# Patient Record
Sex: FEMALE | Race: WHITE | URBAN - METROPOLITAN AREA
[De-identification: names, ages, dates, MRNs, and addresses within clinical notes are randomized per-mention and may not be internally consistent; named-entity substitution may affect disease eponyms.]

---

## 2015-07-14 LAB — TSH SERPL-ACNC: 3.15 UIU/ML (ref 0.33–4.7)

## 2016-02-24 LAB
HPV ABSTRACT: ABNORMAL
PAP-ABSTRACT: ABNORMAL

## 2016-11-28 LAB
CHOLEST SERPL-MCNC: 127 MG/DL
CREAT SERPL-MCNC: 1.1 MG/DL (ref 0.6–1.3)
GFR SERPL CREATININE-BSD FRML MDRD: 54 ML/MIN/1.73M2
GLUCOSE SERPL-MCNC: 95 MG/DL (ref 70–140)
HDLC SERPL-MCNC: 41 MG/DL (ref 40–59)
LDLC SERPL CALC-MCNC: 65 MG/DL
POTASSIUM SERPL-SCNC: 3.6 MMOL/L (ref 3.7–5.5)
TRIGL SERPL-MCNC: 106 MG/DL
TSH SERPL-ACNC: 2.26 UIU/ML (ref 0.33–4.7)

## 2017-01-12 ENCOUNTER — TRANSFERRED RECORDS (OUTPATIENT)
Dept: HEALTH INFORMATION MANAGEMENT | Facility: CLINIC | Age: 47
End: 2017-01-12

## 2017-01-12 LAB — PHQ9 SCORE: 1

## 2017-03-23 ENCOUNTER — TRANSFERRED RECORDS (OUTPATIENT)
Dept: HEALTH INFORMATION MANAGEMENT | Facility: CLINIC | Age: 47
End: 2017-03-23

## 2017-03-23 LAB — PAP-ABSTRACT: NORMAL

## 2017-04-25 ENCOUNTER — OFFICE VISIT (OUTPATIENT)
Dept: FAMILY MEDICINE | Facility: CLINIC | Age: 47
End: 2017-04-25
Payer: COMMERCIAL

## 2017-04-25 VITALS
WEIGHT: 133.6 LBS | SYSTOLIC BLOOD PRESSURE: 96 MMHG | TEMPERATURE: 99.3 F | HEART RATE: 75 BPM | BODY MASS INDEX: 22.81 KG/M2 | OXYGEN SATURATION: 99 % | DIASTOLIC BLOOD PRESSURE: 60 MMHG | HEIGHT: 64 IN

## 2017-04-25 DIAGNOSIS — F32.5 MAJOR DEPRESSIVE DISORDER WITH SINGLE EPISODE, IN REMISSION (H): ICD-10-CM

## 2017-04-25 DIAGNOSIS — R87.619 ATYPICAL GLANDULAR CELLS ON CERVICAL PAP SMEAR: Primary | ICD-10-CM

## 2017-04-25 DIAGNOSIS — Z12.31 ENCOUNTER FOR SCREENING MAMMOGRAM FOR BREAST CANCER: ICD-10-CM

## 2017-04-25 DIAGNOSIS — L70.0 ACNE VULGARIS: ICD-10-CM

## 2017-04-25 DIAGNOSIS — E03.9 ACQUIRED HYPOTHYROIDISM: ICD-10-CM

## 2017-04-25 PROCEDURE — 99204 OFFICE O/P NEW MOD 45 MIN: CPT | Performed by: FAMILY MEDICINE

## 2017-04-25 RX ORDER — BUPROPION HYDROCHLORIDE 100 MG/1
100 TABLET, EXTENDED RELEASE ORAL DAILY
Refills: 3 | COMMUNITY
Start: 2017-04-15 | End: 2017-06-23

## 2017-04-25 RX ORDER — BIOTIN 1 MG
1000 TABLET ORAL DAILY
COMMUNITY
End: 2017-11-28

## 2017-04-25 RX ORDER — NORETHINDRONE ACETATE AND ETHINYL ESTRADIOL 1MG-20(21)
1 KIT ORAL DAILY
COMMUNITY
End: 2017-06-23

## 2017-04-25 RX ORDER — ESCITALOPRAM OXALATE 10 MG/1
10 TABLET ORAL DAILY
Refills: 3 | COMMUNITY
Start: 2017-04-19 | End: 2017-07-13

## 2017-04-25 RX ORDER — CLINDAMYCIN PHOSPHATE 10 UG/ML
LOTION TOPICAL 2 TIMES DAILY
COMMUNITY
End: 2017-08-25

## 2017-04-25 ASSESSMENT — ANXIETY QUESTIONNAIRES
3. WORRYING TOO MUCH ABOUT DIFFERENT THINGS: NOT AT ALL
GAD7 TOTAL SCORE: 0
6. BECOMING EASILY ANNOYED OR IRRITABLE: NOT AT ALL
2. NOT BEING ABLE TO STOP OR CONTROL WORRYING: NOT AT ALL
1. FEELING NERVOUS, ANXIOUS, OR ON EDGE: NOT AT ALL
7. FEELING AFRAID AS IF SOMETHING AWFUL MIGHT HAPPEN: NOT AT ALL
5. BEING SO RESTLESS THAT IT IS HARD TO SIT STILL: NOT AT ALL
IF YOU CHECKED OFF ANY PROBLEMS ON THIS QUESTIONNAIRE, HOW DIFFICULT HAVE THESE PROBLEMS MADE IT FOR YOU TO DO YOUR WORK, TAKE CARE OF THINGS AT HOME, OR GET ALONG WITH OTHER PEOPLE: NOT DIFFICULT AT ALL

## 2017-04-25 ASSESSMENT — PATIENT HEALTH QUESTIONNAIRE - PHQ9: 5. POOR APPETITE OR OVEREATING: NOT AT ALL

## 2017-04-25 NOTE — Clinical Note
Please abstract the following data from this visit with this patient into the appropriate field in Epic:  Colonoscopy done on this date: 1/11/2013 (approximately), by this group: [facility], results were family hx colon cancer - repeat 5 years   Pap smear done on this date: 3/23/17 (approximately), by this group: [facility], results were Atypical glandular cells Endocervical type  Sarah Severson, CMA

## 2017-04-25 NOTE — MR AVS SNAPSHOT
After Visit Summary   4/25/2017    Mary Pearson    MRN: [id number]           Patient Information     Date Of Birth          1970        Visit Information        Provider Department      4/25/2017 6:00 PM Patricio Miller MD [facility] [place] [facility]        Today's Diagnoses     Atypical glandular cells on cervical Pap smear    -  1    Encounter for screening mammogram for breast cancer        Acne vulgaris        Acquired hypothyroidism        Major depressive disorder with single episode, in remission (H)           Follow-ups after your visit        Additional Services     DERMATOLOGY REFERRAL       Your provider has referred you to: [facility] [phone]   [email or web address]    Please be aware that coverage of these services is subject to the terms and limitations of your health insurance plan.  Call member services at your health plan with any benefit or coverage questions.      Please bring the following with you to your appointment:    (1) Any X-Rays, CTs or MRIs which have been performed.  Contact the facility where they were done to arrange for  prior to your scheduled appointment.    (2) List of current medications  (3) This referral request   (4) Any documents/labs given to you for this referral                  Your next 10 appointments already scheduled     May 02, 2017  4:20 PM CDT   Office Visit with Patricio Miller MD   [facility] [place] ([facility])    [address] [place] [id number] [phone]           Bring a current list of meds and any records pertaining to this visit.  For Physicals, please bring immunization records and any forms needing to be filled out.  Please arrive 10 minutes early to complete paperwork.              Future tests that were ordered for you today     Open Future Orders        Priority Expected Expires Ordered    *MA Screening Digital Bilateral  "Routine  2018    US Pelvic Complete w Transvaginal Routine 2017            Who to contact     If you have questions or need follow up information about today's clinic visit or your schedule please contact [facility] [place] directly at [phone].  Normal or non-critical lab and imaging results will be communicated to you by MyChart, letter or phone within 4 business days after the clinic has received the results. If you do not hear from us within 7 days, please contact the clinic through [employer] or phone. If you have a critical or abnormal lab result, we will notify you by phone as soon as possible.  Submit refill requests through [employer] or call your pharmacy and they will forward the refill request to us. Please allow 3 business days for your refill to be completed.          Additional Information About Your Visit        [employer] Information     [employer] lets you send messages to your doctor, view your test results, renew your prescriptions, schedule appointments and more. To sign up, go to [email or web address] . Click on \"Log in\" on the left side of the screen, which will take you to the Welcome page. Then click on \"Sign up Now\" on the right side of the page.     You will be asked to enter the access code listed below, as well as some personal information. Please follow the directions to create your username and password.     Your access code is: [email or web address]  Expires: 2017  8:13 AM     Your access code will  in 90 days. If you need help or a new code, please call your [facility] clinic or [phone].        Care EveryWhere ID     This is your Care EveryWhere ID. This could be used by other organizations to access your [facility] medical records  [id number]        Your Vitals Were     Pulse Temperature Height Pulse Oximetry Breastfeeding? BMI (Body Mass Index)    75 99.3  F (37.4  C) (Tympanic) 5' 3.5\" (1.613 m) 99% No 23.29 kg/m2       Blood " Pressure from Last 3 Encounters:   04/25/17 96/60    Weight from Last 3 Encounters:   04/25/17 133 lb 9.6 oz (60.6 kg)              We Performed the Following     DERMATOLOGY REFERRAL        Primary Care Provider    None Specified       No primary provider on file.        Thank you!     Thank you for choosing [facility] [place]  for your care. Our goal is always to provide you with excellent care. Hearing back from our patients is one way we can continue to improve our services. Please take a few minutes to complete the written survey that you may receive in the mail after your visit with us. Thank you!             Your Updated Medication List - Protect others around you: Learn how to safely use, store and throw away your medicines at www.disposemymeds.org.          This list is accurate as of: 4/25/17 11:59 PM.  Always use your most recent med list.                   Brand Name Dispense Instructions for use    biotin 1000 MCG Tabs tablet      Take 1,000 mcg by mouth daily Takes 2,500mcg       buPROPion 100 MG 12 hr tablet    WELLBUTRIN SR     Take 100 mg by mouth daily       clindamycin 1 % lotion    CLEOCIN T     Apply topically 2 times daily       escitalopram 10 MG tablet    LEXAPRO     Take 10 mg by mouth daily       LEVOTHYROXINE SODIUM PO      Take 88 mcg by mouth daily       METROCREAM 0.75 % cream   Generic drug:  metroNIDAZOLE      Apply topically 2 times daily       norethindrone-ethinyl estradiol 1-20 MG-MCG per tablet    JUNEL FE 1/20     Take 1 tablet by mouth daily       PROBIOTIC ADVANCED PO

## 2017-04-25 NOTE — NURSING NOTE
"Chief Complaint   Patient presents with     Establish Care       Initial BP 96/60 (BP Location: Right arm, Cuff Size: Adult Regular)  Pulse 75  Temp 99.3  F (37.4  C) (Tympanic)  Ht 5' 3.5\" (1.613 m)  Wt 133 lb 9.6 oz (60.6 kg)  SpO2 99%  Breastfeeding? No  BMI 23.29 kg/m2 Estimated body mass index is 23.29 kg/(m^2) as calculated from the following:    Height as of this encounter: 5' 3.5\" (1.613 m).    Weight as of this encounter: 133 lb 9.6 oz (60.6 kg).  Medication Reconciliation: complete  "

## 2017-04-25 NOTE — PROGRESS NOTES
SUBJECTIVE:                                                    Mary Pearson is a 47 year old female who presents to clinic today for the following health issues:      New Patient/Transfer of Care    Patient recently moved from [place]. Reports that she is here to establish her care. Previously was going to [facility] in [place]. Care everywhere was accessed to review her records.  She does not need any refills on her medication.  Last month she had a Pap smear. She was told that Pap smear is normal. An Pap smear results were pulled through care everywhere it showed atypical glandular cells. This was reviewed with the patient and differential diagnoses and further management discussed. Patient will need to get a pelvic ultrasound in the next few days and a follow-up within the week for colposcopy and endometrial biopsy. She verbalizes understanding and agreement to the plan    Patient is due for a mammogram.    Colonoscopy is up to date. Family history of colon cancer. Patient has history of colon polyps.    Depression is well managed. Currently using Lexapro 10 mg and Wellbutrin.  PHQ-9 SCORE 4/25/2017   Total Score 0     She uses acne medication. Acne is not well controlled. Requested dermatology referral.    Also uses Synthroid. Last TSH was checked in November 2016 it was within normal range. It was reviewed in care everywhere.        Problem list and histories reviewed & adjusted, as indicated.  Additional history: as documented    Patient Active Problem List   Diagnosis     Atypical glandular cells on cervical Pap smear     Acne vulgaris     Acquired hypothyroidism     Major depressive disorder with single episode, in remission (H)     DUB (dysfunctional uterine bleeding) - on OCP     Past Surgical History:   Procedure Laterality Date     AS REDUCTION OF LARGE BREAST         Social History   Substance Use Topics     Smoking status: Never Smoker     Smokeless tobacco: Never Used     Alcohol use Yes  "     Comment: once a month     Family History   Problem Relation Age of Onset     Colon Cancer Mother 49     DIABETES Father          Current Outpatient Prescriptions   Medication Sig Dispense Refill     buPROPion (WELLBUTRIN SR) 100 MG 12 hr tablet Take 100 mg by mouth daily  3     escitalopram (LEXAPRO) 10 MG tablet Take 10 mg by mouth daily  3     LEVOTHYROXINE SODIUM PO Take 88 mcg by mouth daily       norethindrone-ethinyl estradiol (JUNEL FE 1/20) 1-20 MG-MCG per tablet Take 1 tablet by mouth daily       clindamycin (CLEOCIN T) 1 % lotion Apply topically 2 times daily       metroNIDAZOLE (METROCREAM) 0.75 % cream Apply topically 2 times daily       biotin 1000 MCG TABS tablet Take 1,000 mcg by mouth daily Takes 2,500mcg       Probiotic Product (PROBIOTIC ADVANCED PO)        Allergies   Allergen Reactions     Nickel Rash       Reviewed and updated as needed this visit by clinical staff       Reviewed and updated as needed this visit by Provider         ROS:  C: NEGATIVE for fever, chills, change in weight  I: NEGATIVE for worrisome rashes, moles or lesions  E: NEGATIVE for vision changes or irritation  E/M: NEGATIVE for ear, mouth and throat problems  R: NEGATIVE for significant cough or SOB  B: NEGATIVE for masses, tenderness or discharge  CV: NEGATIVE for chest pain, palpitations or peripheral edema  GI: NEGATIVE for nausea, abdominal pain, heartburn, or change in bowel habits  : NEGATIVE for frequency, dysuria, or hematuria  M: NEGATIVE for significant arthralgias or myalgia  N: NEGATIVE for weakness, dizziness or paresthesias  E: NEGATIVE for temperature intolerance, skin/hair changes  H: NEGATIVE for bleeding problems  P: NEGATIVE for changes in mood or affect    OBJECTIVE:                                                    BP 96/60 (BP Location: Right arm, Cuff Size: Adult Regular)  Pulse 75  Temp 99.3  F (37.4  C) (Tympanic)  Ht 5' 3.5\" (1.613 m)  Wt 133 lb 9.6 oz (60.6 kg)  SpO2 99%  " Breastfeeding? No  BMI 23.29 kg/m2  Body mass index is 23.29 kg/(m^2).  GENERAL: healthy, alert and no distress  EYES: Eyes grossly normal to inspection, extraocular movements - intact, and PERRL  HENT: ear canals- normal; TMs- normal; Nose- normal; Mouth- no ulcers, no lesions  NECK: no tenderness, no adenopathy, no asymmetry, no masses, no stiffness; thyroid- normal to palpation  RESP: lungs clear to auscultation - no rales, no rhonchi, no wheezes  CV: regular rates and rhythm, normal S1 S2, no S3 or S4 and no murmur, no click or rub -  ABDOMEN: soft, no tenderness, no  hepatosplenomegaly, no masses, normal bowel sounds  MS: extremities- no gross deformities noted, no edema  SKIN: no suspicious lesions, no rashes  NEURO: strength and tone- normal, sensory exam- grossly normal, mentation- intact, speech- normal, reflexes- symmetric  BACK: no CVA tenderness, no paralumbar tenderness  - female: cervix- normal, adnexae- normal; uterus- normal, no masses, no discharge  LYMPHATICS: ant. cervical- normal, post. cervical- normal, axillary- normal, supraclavicular- normal, inguinal- normal         ASSESSMENT/PLAN:                                                      1. Atypical glandular cells on cervical Pap smear  Plan as above. Patient will see me back for colposcopy and endometrial biopsy after pelvic ultrasound is completed  - US Pelvic Complete w Transvaginal; Future    2. Encounter for screening mammogram for breast cancer  Screening mammogram ordered  - *MA Screening Digital Bilateral; Future    3. Acne vulgaris  Dermatology referral provided  - DERMATOLOGY REFERRAL    4. Acquired hypothyroidism  TSH is within normal range. No refills requested at this time    5. Major depressive disorder with single episode, in remission (H)  Well-controlled. She will contact me when she is out of refills.      Follow up with Provider - within 1 week.     Patricio Miller MD  [facility]

## 2017-04-26 PROBLEM — R87.619 ATYPICAL GLANDULAR CELLS ON CERVICAL PAP SMEAR: Status: ACTIVE | Noted: 2017-04-26

## 2017-04-26 PROBLEM — E03.9 ACQUIRED HYPOTHYROIDISM: Status: ACTIVE | Noted: 2017-04-26

## 2017-04-26 PROBLEM — L70.0 ACNE VULGARIS: Status: ACTIVE | Noted: 2017-04-26

## 2017-04-26 PROBLEM — N93.8 DUB (DYSFUNCTIONAL UTERINE BLEEDING): Status: ACTIVE | Noted: 2017-04-26

## 2017-04-26 PROBLEM — F32.5 MAJOR DEPRESSIVE DISORDER WITH SINGLE EPISODE, IN REMISSION (H): Status: ACTIVE | Noted: 2017-04-26

## 2017-04-26 ASSESSMENT — ANXIETY QUESTIONNAIRES: GAD7 TOTAL SCORE: 0

## 2017-04-26 ASSESSMENT — PATIENT HEALTH QUESTIONNAIRE - PHQ9: SUM OF ALL RESPONSES TO PHQ QUESTIONS 1-9: 0

## 2017-04-28 ENCOUNTER — TELEPHONE (OUTPATIENT)
Dept: FAMILY MEDICINE | Facility: CLINIC | Age: 47
End: 2017-04-28

## 2017-04-28 NOTE — TELEPHONE ENCOUNTER
Patient calling to cancel orders for colposcopy and ultra sound. Patient will be seeing her OBGYN for concerns. She wanted to update Dr. Miller.  Ok to leave any messages: yes  [phone] (home)     Thank you  An Le

## 2017-05-03 ENCOUNTER — OFFICE VISIT (OUTPATIENT)
Dept: FAMILY MEDICINE | Facility: CLINIC | Age: 47
End: 2017-05-03
Payer: COMMERCIAL

## 2017-05-03 DIAGNOSIS — L71.9 ROSACEA: Primary | ICD-10-CM

## 2017-05-03 DIAGNOSIS — L70.0 ACNE VULGARIS: ICD-10-CM

## 2017-05-03 PROCEDURE — 99214 OFFICE O/P EST MOD 30 MIN: CPT | Performed by: FAMILY MEDICINE

## 2017-05-03 RX ORDER — DOXYCYCLINE HYCLATE 50 MG/1
50 TABLET, FILM COATED ORAL DAILY
Qty: 30 TABLET | Refills: 6 | Status: SHIPPED | OUTPATIENT
Start: 2017-05-03 | End: 2017-07-13

## 2017-05-03 RX ORDER — METRONIDAZOLE 10 MG/G
GEL TOPICAL DAILY
Qty: 60 G | Refills: 11 | Status: SHIPPED | OUTPATIENT
Start: 2017-05-03 | End: 2017-07-13

## 2017-05-03 RX ORDER — CLINDAMYCIN PHOSPHATE 10 MG/G
GEL TOPICAL
Qty: 30 G | Refills: 11 | Status: SHIPPED | OUTPATIENT
Start: 2017-05-03 | End: 2017-07-13

## 2017-05-03 NOTE — PROGRESS NOTES
[facility] - PRIMARY CARE SKIN    CC : Acne   SUBJECTIVE:                                                    Mary Pearson is a 47 year old female who presents to clinic today because of problems with acne and rosacea. Acne control has begun to improve and may have been provoked by increased stress from moving to [place]. She has also been recently diagnosed with rosacea on the forehead.    Current treatment : Clindamycin 1% lotion on the chin, metronidazole 0.75% cream, Junel BCP ( for acne control )  Response to treatment : Metronidazole cream has kept symptoms at bay until the next day when applied twice daily, but she has not applied it every night.  Side effects noted : She reports excessive skin flaking after use of benzoyl peroxide.    Other treatments : Doxycycline in the past, oral BCP for acne and regulation of menstrual cycle.  Products used : Merle Odell facial moisturizer. Dove soap to wash face.  Skin type : dry skin on the cheeks, oily on the forehead    Refer to electronic medical record (EMR) for past medical history and medications.    INTEGUMENTARY/SKIN: POSITIVE for acne  ROS : 14 point review of systems was negative except the symptoms listed above in the HPI.    This document serves as a record of the services and decisions personally performed and made by Cortney Virgen MD. It was created on her behalf by Silverio Sutherland, a trained medical scribe.  The creation of this document is based on the scribe's personal observations and the provider's statements to the medical scribe.  Silverio Sutherland, May 3, 2017 3:22 PM      OBJECTIVE:                                                    GENERAL: healthy, alert and no distress  SKIN: Alejandro Skin Type - I.  Face were examined. The dermatoscope was used to help evaluate pigmented lesions.  Skin Pertinent Findings:  Face : Inflammatory papules in various stages of resolution on the chin   Neck: clear  Forehead : Scattered erythematous papules in monophasic  appearance.                          Eyes : C&S are clear  Diagnostic Test Results:  none     MDM : . Discussed pathophysiology of acne, treatment options, and expectations for treatment response. Discussed treatment options for the rosacea.      ASSESSMENT:                                                      Encounter Diagnoses   Name Primary?     Rosacea Yes     Acne vulgaris          PLAN:                                                    Patient Instructions   FUTURE APPOINTMENTS  Follow up in 6 month(s).    TOPICAL MEDICATION INSTRUCTIONS  Metronidazole (Metrogel) 1% gel.    Apply a layer to the affected area(s) on the forehead two times per day, everyday regardless if symptoms are present or not.    Keep in mind to also regularly use moisturizer, as this preventative measure can help maintain your skin's natural moisture barrier.    Apply moisturizer after application of medication.    ORAL ANTIBIOTIC  Take by mouth doxycycline 50 mg one time(s) a day.    TETRACYCLINE ANTIBIOTIC INFORMATION  Minocycline and doxycycline are tetracycline antibiotics and can increase your sun sensitivity, so make sure to be vigilant in regularly applying sunscreen. Also, avoid taking these with dairy products, as calcium can bind the antibiotic, making it unavailable to your body.    Avoid excessive alcohol, caffeine, chocolate, spicy food consumption or sun exposure.    TOPICAL ANTIBIOTIC  Apply clindamycin 1% gel to bumps as spot treatmnent two time(s) a day.    Consider using Image facial moisturizer cream, CeraVe AM facial moisturizer, Cetaphil facial moisturizer.        PROCEDURES:                                                    None.    TT : 25 minutes.  CT : 15 minutes.      The information in this document, created by the medical scribe for me, accurately reflects the services I personally performed and the decisions made by me. I have reviewed and approved this document for accuracy prior to leaving the patient care  area.  Cortney Virgen MD May 3, 2017 3:22 PM  [facility] - PRIMARY CARE SKIN

## 2017-05-03 NOTE — PATIENT INSTRUCTIONS
FUTURE APPOINTMENTS  Follow up in 6 month(s).    TOPICAL MEDICATION INSTRUCTIONS  Metronidazole (Metrogel) 1% gel.    Apply a layer to the affected area(s) on the forehead two times per day, everyday regardless if symptoms are present or not.    Keep in mind to also regularly use moisturizer, as this preventative measure can help maintain your skin's natural moisture barrier.    Apply moisturizer after application of medication.    ORAL ANTIBIOTIC  Take by mouth doxycycline 50 mg one time(s) a day.    TETRACYCLINE ANTIBIOTIC INFORMATION  Minocycline and doxycycline are tetracycline antibiotics and can increase your sun sensitivity, so make sure to be vigilant in regularly applying sunscreen. Also, avoid taking these with dairy products, as calcium can bind the antibiotic, making it unavailable to your body.    Avoid excessive alcohol, caffeine, chocolate, spicy food consumption or sun exposure.    TOPICAL ANTIBIOTIC  Apply clindamycin 1% gel to bumps as spot treatmnent two time(s) a day.    Consider using Image facial moisturizer cream, CeraVe AM facial moisturizer, Cetaphil facial moisturizer.

## 2017-05-03 NOTE — MR AVS SNAPSHOT
After Visit Summary   5/3/2017    Mary Pearson    MRN: [id number]           Patient Information     Date Of Birth          1970        Visit Information        Provider Department      5/3/2017 3:40 PM Bia Virgen MD [facility]        Today's Diagnoses     Rosacea    -  1    Acne vulgaris          Care Instructions    FUTURE APPOINTMENTS  Follow up in 6 month(s).    TOPICAL MEDICATION INSTRUCTIONS  Metronidazole (Metrogel) 1% gel.    Apply a layer to the affected area(s) on the forehead two times per day, everyday regardless if symptoms are present or not.    Keep in mind to also regularly use moisturizer, as this preventative measure can help maintain your skin's natural moisture barrier.    Apply moisturizer after application of medication.    ORAL ANTIBIOTIC  Take by mouth doxycycline 50 mg one time(s) a day.    TETRACYCLINE ANTIBIOTIC INFORMATION  Minocycline and doxycycline are tetracycline antibiotics and can increase your sun sensitivity, so make sure to be vigilant in regularly applying sunscreen. Also, avoid taking these with dairy products, as calcium can bind the antibiotic, making it unavailable to your body.    Avoid excessive alcohol, caffeine, chocolate, spicy food consumption or sun exposure.    TOPICAL ANTIBIOTIC  Apply clindamycin 1% gel to bumps as spot treatmnent two time(s) a day.    Consider using Image facial moisturizer cream, CeraVe AM facial moisturizer, Cetaphil facial moisturizer.        Follow-ups after your visit        Who to contact     If you have questions or need follow up information about today's clinic visit or your schedule please contact [facility] directly at [phone].  Normal or non-critical lab and imaging results will be communicated to you by MyChart, letter or phone within 4 business days after the clinic has received the results. If you do not hear from us within 7 days, please  "contact the clinic through [employer] or phone. If you have a critical or abnormal lab result, we will notify you by phone as soon as possible.  Submit refill requests through [employer] or call your pharmacy and they will forward the refill request to us. Please allow 3 business days for your refill to be completed.          Additional Information About Your Visit        [employer] Search Information     [employer] lets you send messages to your doctor, view your test results, renew your prescriptions, schedule appointments and more. To sign up, go to [email or web address] . Click on \"Log in\" on the left side of the screen, which will take you to the Welcome page. Then click on \"Sign up Now\" on the right side of the page.     You will be asked to enter the access code listed below, as well as some personal information. Please follow the directions to create your username and password.     Your access code is: [email or web address]  Expires: 2017  8:13 AM     Your access code will  in 90 days. If you need help or a new code, please call your [place] clinic or [phone].        Care EveryWhere ID     This is your Care EveryWhere ID. This could be used by other organizations to access your [place] medical records  [id number]         Blood Pressure from Last 3 Encounters:   17 96/60    Weight from Last 3 Encounters:   17 133 lb 9.6 oz (60.6 kg)              Today, you had the following     No orders found for display         Today's Medication Changes          These changes are accurate as of: 5/3/17  3:46 PM.  If you have any questions, ask your nurse or doctor.               Start taking these medicines.        Dose/Directions    Doxycycline Hyclate 50 MG Tabs   Used for:  Rosacea   Started by:  Bia Virgen MD        Dose:  50 mg   Take 50 mg by mouth daily   Quantity:  30 tablet   Refills:  6         These medicines have changed or have updated prescriptions.        Dose/Directions    * " clindamycin 1 % lotion   Commonly known as:  CLEOCIN T   This may have changed:  Another medication with the same name was added. Make sure you understand how and when to take each.   Changed by:  Patricio Miller MD        Apply topically 2 times daily   Refills:  0       * clindamycin 1 % topical gel   Commonly known as:  CLINDAMAX   This may have changed:  You were already taking a medication with the same name, and this prescription was added. Make sure you understand how and when to take each.   Used for:  Acne vulgaris   Changed by:  Bia Virgen MD        Apply to affected areas on face bid   Quantity:  30 g   Refills:  11       * METROCREAM 0.75 % cream   This may have changed:  Another medication with the same name was added. Make sure you understand how and when to take each.   Generic drug:  metroNIDAZOLE   Changed by:  Patricio Miller MD        Apply topically 2 times daily   Refills:  0       * metroNIDAZOLE 1 % gel   Commonly known as:  METROGEL   This may have changed:  You were already taking a medication with the same name, and this prescription was added. Make sure you understand how and when to take each.   Used for:  Rosacea   Changed by:  Bia Virgen MD        Apply topically daily Apply to affected areas on face bid   Quantity:  60 g   Refills:  11       * Notice:  This list has 4 medication(s) that are the same as other medications prescribed for you. Read the directions carefully, and ask your doctor or other care provider to review them with you.         Where to get your medicines      These medications were sent to [place] [address] [place] [address] [address]     Phone:  [phone]     clindamycin 1 % topical gel    Doxycycline Hyclate 50 MG Tabs    metroNIDAZOLE 1 % gel                Primary Care Provider    None Specified       No primary provider on file.        Thank you!     Thank you for choosing  [facility]  for your care. Our goal is always to provide you with excellent care. Hearing back from our patients is one way we can continue to improve our services. Please take a few minutes to complete the written survey that you may receive in the mail after your visit with us. Thank you!             Your Updated Medication List - Protect others around you: Learn how to safely use, store and throw away your medicines at www.disposemymeds.org.          This list is accurate as of: 5/3/17  3:46 PM.  Always use your most recent med list.                   Brand Name Dispense Instructions for use    biotin 1000 MCG Tabs tablet      Take 1,000 mcg by mouth daily Takes 2,500mcg       buPROPion 100 MG 12 hr tablet    WELLBUTRIN SR     Take 100 mg by mouth daily       * clindamycin 1 % lotion    CLEOCIN T     Apply topically 2 times daily       * clindamycin 1 % topical gel    CLINDAMAX    30 g    Apply to affected areas on face bid       Doxycycline Hyclate 50 MG Tabs     30 tablet    Take 50 mg by mouth daily       escitalopram 10 MG tablet    LEXAPRO     Take 10 mg by mouth daily       LEVOTHYROXINE SODIUM PO      Take 88 mcg by mouth daily       * METROCREAM 0.75 % cream   Generic drug:  metroNIDAZOLE      Apply topically 2 times daily       * metroNIDAZOLE 1 % gel    METROGEL    60 g    Apply topically daily Apply to affected areas on face bid       norethindrone-ethinyl estradiol 1-20 MG-MCG per tablet    JUNEL FE 1/20     Take 1 tablet by mouth daily       PROBIOTIC ADVANCED PO          * Notice:  This list has 4 medication(s) that are the same as other medications prescribed for you. Read the directions carefully, and ask your doctor or other care provider to review them with you.

## 2017-06-01 ENCOUNTER — TRANSFERRED RECORDS (OUTPATIENT)
Dept: HEALTH INFORMATION MANAGEMENT | Facility: CLINIC | Age: 47
End: 2017-06-01

## 2017-06-01 LAB — MAMMOGRAM: NORMAL

## 2017-06-21 ENCOUNTER — TELEPHONE (OUTPATIENT)
Dept: FAMILY MEDICINE | Facility: CLINIC | Age: 47
End: 2017-06-21

## 2017-06-21 DIAGNOSIS — Z12.31 ENCOUNTER FOR SCREENING MAMMOGRAM FOR BREAST CANCER: ICD-10-CM

## 2017-06-21 DIAGNOSIS — Z53.9 DIAGNOSIS NOT YET DEFINED: Primary | ICD-10-CM

## 2017-06-21 NOTE — TELEPHONE ENCOUNTER
CDI report of screening mammogram is negative. Repeat screening in 1-2 year recommended  Notify patient.     Patricio Miller MD  [facility], [place]

## 2017-06-21 NOTE — TELEPHONE ENCOUNTER
Patient called back and was given result message per Dr. Miller. Patient agrees to repeat screening in 1-2 years. Mary Kenyon RN

## 2017-06-21 NOTE — TELEPHONE ENCOUNTER
Left message on answering machine for patient to call back.    Francine OchoaRN  [facility]  [phone]

## 2017-06-23 ENCOUNTER — OFFICE VISIT (OUTPATIENT)
Dept: OBGYN | Facility: CLINIC | Age: 47
End: 2017-06-23
Attending: OBSTETRICS & GYNECOLOGY
Payer: COMMERCIAL

## 2017-06-23 VITALS
SYSTOLIC BLOOD PRESSURE: 92 MMHG | DIASTOLIC BLOOD PRESSURE: 65 MMHG | WEIGHT: 133.3 LBS | BODY MASS INDEX: 22.76 KG/M2 | HEART RATE: 75 BPM | HEIGHT: 64 IN

## 2017-06-23 DIAGNOSIS — Z01.812 PRE-PROCEDURE LAB EXAM: Primary | ICD-10-CM

## 2017-06-23 DIAGNOSIS — R87.619 ABNORMAL GLANDULAR PAPANICOLAOU SMEAR OF CERVIX: ICD-10-CM

## 2017-06-23 LAB
HCG UR QL: NEGATIVE
INTERNAL QC OK POCT: YES

## 2017-06-23 PROCEDURE — 57456 ENDOCERV CURETTAGE W/SCOPE: CPT | Mod: ZF | Performed by: OBSTETRICS & GYNECOLOGY

## 2017-06-23 PROCEDURE — 88305 TISSUE EXAM BY PATHOLOGIST: CPT | Performed by: OBSTETRICS & GYNECOLOGY

## 2017-06-23 PROCEDURE — 99212 OFFICE O/P EST SF 10 MIN: CPT | Mod: ZF

## 2017-06-23 PROCEDURE — 81025 URINE PREGNANCY TEST: CPT | Mod: ZF | Performed by: OBSTETRICS & GYNECOLOGY

## 2017-06-23 RX ORDER — MISOPROSTOL 200 UG/1
TABLET ORAL
Qty: 2 TABLET | Refills: 0 | Status: SHIPPED | OUTPATIENT
Start: 2017-06-23 | End: 2017-08-25

## 2017-06-23 NOTE — PROGRESS NOTES
"  Colposcopy Visit/Procedure Note:    Mary Pearson is an 47 year old, nulligravid female who comes in for diagnosis of abnormal pap screen.  RONAN.     Menstrual History:  Menstrual History 2017   LAST MENSTRUAL PERIOD 2017     Atypical glandular cells per report scanned in EPIC    Per pt report last HPV neg.  Has had HPV in past.     Dates/results of previous cervical pathology: 3/17- RONAN        History   Smoking Status     Never Smoker   Smokeless Tobacco     Never Used       Allergies as of 2017 - Ousmane as Reviewed 2017   Allergen Reaction Noted     Nickel Rash 2017        Colposcopy Procedure:    Consent:  Details of the colposcopic procedure were reviewed. Risks, benefits of treatment, and alternate forms of evaluation were discussed.  Patient's questions were elicited and answered.   Written consent was obtained and scanned into medical record.     Verification of Procedure  Just before the procedure begins, through verbal and active participation of team members, I verified:   Initials   Patient Name smd   Patient  smd   Procedure to be performed smd       OBJECTIVE: BP 92/65  Pulse 75  Ht 1.613 m (5' 3.5\")  Wt 60.5 kg (133 lb 4.8 oz)  LMP 2017 (Approximate)  BMI 23.24 kg/m2    Pelvic Exam:  EG/BUS: Normal genital architecture without lesions, erythema or abnormal secretions. Bartholin's, Urethra, [place]'s glands are normal.   Vagina: moist, pink, rugae with creamy, white and odorlesssecretions  Cervix: Nulliparous, and no lesions  Rectum:anus normal    PROCEDURE:    Acetic acid and/or Lugol's solution applied to cervix.  Colposcopic exam of the cervix and apex of the vagina was conducted in the usual fashion.     Findings:  SCJ was NOT seen entirely and the exam UNsatisfactory.    There were no visible lesions    ECC: was obtained and placed in labeled Formalin Jar.       The cervix was prepped with Technicare.  A single tooth tenaculum was applied to the anterior " lip of the cervix. The endometrial pipelle was  NOT ABLE to advanced through the cervix, despite attempting dilators.  The tenaculum was removed from the cervix and the tenaculum site made hemostatic with Silver Nitrate sticks .    EBL: 5cc    Complications:  none  Pathology: EMB sample was sent to pathology.  Tolerance of Procedure:  Patient did tolerate the procedure well.    Assessment: Normal exam without visible pathology    Plan:   Plan for  U/s and then EMB.  Pt to call back to schedule EMB during next menses and to take cytotec 2 hours prior to procedure.     Biopsies sent to pathology.  Will contact patient with results and recommended follow-up plan.      Patient advised to contact clinic with heavy vaginal bleeding, fever over 101 degrees F, or any other concerns.    Advised that evaluation of sexual contacts is NOT warranted as she can not get the same virus again. Risk of exposure to a NEW virus is possible with partner change.    Verbalized understanding and agreement with plan.      Kayla Harris MD, FACOG  [facility]  OB/GYN    6/23/2017  9:08 AM

## 2017-06-23 NOTE — MR AVS SNAPSHOT
After Visit Summary   6/23/2017    Mary Pearson    MRN: [id number]           Patient Information     Date Of Birth          1970        Visit Information        Provider Department      6/23/2017 8:15 AM Kayla Harris MD; [facility]        Today's Diagnoses     Pre-procedure lab exam    -  1    Abnormal glandular Papanicolaou smear of cervix          Care Instructions      Colposcopy Post-Procedure Patient Instructions      You may experience any of the following for a couple of days following colposcopy:    Mild cramping    Vaginal bleeding     Vaginal discharge that looks black and clumpy    Please call your healthcare provider if you have any of the following symptoms:    Fever--Temperature greater than 100 degrees    Cramping after 48 hours    Bleeding heavier than a normal period in the first 24-48 hours    If you are bleeding and soaking more than one pad an hour    Or any other worrisome problems.    We recommend that:    You use pads, not tampons for the bleeding.    You may resume sexual activity in 2-3 days, or after bleeding stops.    You may use Tylenol or ibuprofen (Motrin or Advil) for any discomfort.      Kayla Harris MD          Follow-ups after your visit        Who to contact     Please call your clinic at [phone] to:    Ask questions about your health    Make or cancel appointments    Discuss your medicines    Learn about your test results    Speak to your doctor   If you have compliments or concerns about an experience at your clinic, or if you wish to file a complaint, please contact [facility] Patient Relations at [phone] or email us at [email or web address]         Additional Information About Your Visit        [employer] Information     [employer] is an electronic gateway that provides easy, online access to your medical records. With [employer], you can request a clinic  "appointment, read your test results, renew a prescription or communicate with your care team.     To sign up for [employer] visit the website at [email or web address]   You will be asked to enter the access code listed below, as well as some personal information. Please follow the directions to create your username and password.     Your access code is: [email or web address]  Expires: 2017  8:13 AM     Your access code will  in 90 days. If you need help or a new code, please contact your [facility] or call [phone] for assistance.        Care EveryWhere ID     This is your Care EveryWhere ID. This could be used by other organizations to access your [place] medical records  [id number]        Your Vitals Were     Pulse Height Last Period BMI (Body Mass Index)          75 1.613 m (5' 3.5\") 2017 (Approximate) 23.24 kg/m2         Blood Pressure from Last 3 Encounters:   17 92/65   17 96/60    Weight from Last 3 Encounters:   17 60.5 kg (133 lb 4.8 oz)   17 60.6 kg (133 lb 9.6 oz)              We Performed the Following     Colposcopy Cervix/Upper Vagina with Endocervical Curettage [21780]     hCG qual urine POCT     Surgical pathology exam [BOK1374]          Today's Medication Changes          These changes are accurate as of: 17  9:10 AM.  If you have any questions, ask your nurse or doctor.               Start taking these medicines.        Dose/Directions    misoprostol 200 MCG tablet   Commonly known as:  CYTOTEC   Used for:  Abnormal glandular Papanicolaou smear of cervix   Started by:  Kayla Harris MD        Take 2 hours before procedure. Place 1 tab between cheek and gum on the right, and 2nd tab on the left. Dissolve for 30 min, then swallow.   Quantity:  2 tablet   Refills:  0         Stop taking these medicines if you haven't already. Please contact your care team if you have questions.     buPROPion 100 MG 12 hr tablet "   Commonly known as:  WELLBUTRIN SR   Stopped by:  Kayla Harris MD           norethindrone-ethinyl estradiol 1-20 MG-MCG per tablet   Commonly known as:  JUNEL FE 1/20   Stopped by:  Kayla Harris MD                Where to get your medicines      These medications were sent to [facility] [address] [place] [facility] [place] [address]  [address] [place] [address], [facility] [address]     Phone:  [phone]     misoprostol 200 MCG tablet                Primary Care Provider    None Specified       No primary provider on file.        Equal Access to Services     [facility]: Hadii jamir john Sovirgie, waaxda luqadaha, qaybta kaalmaangie crain, ramona tuttle . So [facility] [phone].    ATENCIÓN: Si habla español, tiene a gilman disposición servicios gratuitos de asistencia lingüística. [facility] [phone].    We comply with applicable federal civil rights laws and [place] laws. We do not discriminate on the basis of race, color, national origin, age, disability sex, sexual orientation or gender identity.            Thank you!     Thank you for choosing [facility]  for your care. Our goal is always to provide you with excellent care. Hearing back from our patients is one way we can continue to improve our services. Please take a few minutes to complete the written survey that you may receive in the mail after your visit with us. Thank you!             Your Updated Medication List - Protect others around you: Learn how to safely use, store and throw away your medicines at www.disposemymeds.org.          This list is accurate as of: 6/23/17  9:10 AM.  Always use your most recent med list.                   Brand Name Dispense Instructions for use Diagnosis    biotin 1000 MCG Tabs tablet      Take 1,000 mcg by mouth daily Takes 2,500mcg        * clindamycin 1 % lotion    CLEOCIN T     Apply topically 2 times daily        * clindamycin 1 % topical gel     CLINDAMAX    30 g    Apply to affected areas on face bid    Acne vulgaris       Doxycycline Hyclate 50 MG Tabs     30 tablet    Take 50 mg by mouth daily    Rosacea       escitalopram 10 MG tablet    LEXAPRO     Take 10 mg by mouth daily        LEVOTHYROXINE SODIUM PO      Take 88 mcg by mouth daily        * METROCREAM 0.75 % cream   Generic drug:  metroNIDAZOLE      Apply topically 2 times daily        * metroNIDAZOLE 1 % gel    METROGEL    60 g    Apply topically daily Apply to affected areas on face bid    Rosacea       misoprostol 200 MCG tablet    CYTOTEC    2 tablet    Take 2 hours before procedure. Place 1 tab between cheek and gum on the right, and 2nd tab on the left. Dissolve for 30 min, then swallow.    Abnormal glandular Papanicolaou smear of cervix       PROBIOTIC ADVANCED PO           * Notice:  This list has 4 medication(s) that are the same as other medications prescribed for you. Read the directions carefully, and ask your doctor or other care provider to review them with you.

## 2017-06-23 NOTE — PATIENT INSTRUCTIONS

## 2017-06-23 NOTE — LETTER
"2017       RE: Mary Pearson  [address]  [place] [address]     Dear Colleague,    Thank you for referring your patient, Mary Pearson, to the [facility] at [facility]. Please see a copy of my visit note below.      Colposcopy Visit/Procedure Note:    Mary Pearson is an 47 year old, nulligravid female who comes in for diagnosis of abnormal pap screen.  RONAN.     Menstrual History:  Menstrual History 2017   LAST MENSTRUAL PERIOD 2017     Atypical glandular cells per report scanned in EPIC    Per pt report last HPV neg.  Has had HPV in past.     Dates/results of previous cervical pathology: 3/17- RONAN        History   Smoking Status     Never Smoker   Smokeless Tobacco     Never Used       Allergies as of 2017 - Ousmane as Reviewed 2017   Allergen Reaction Noted     Nickel Rash 2017        Colposcopy Procedure:    Consent:  Details of the colposcopic procedure were reviewed. Risks, benefits of treatment, and alternate forms of evaluation were discussed.  Patient's questions were elicited and answered.   Written consent was obtained and scanned into medical record.     Verification of Procedure  Just before the procedure begins, through verbal and active participation of team members, I verified:   Initials   Patient Name smd   Patient  smd   Procedure to be performed smd       OBJECTIVE: BP 92/65  Pulse 75  Ht 1.613 m (5' 3.5\")  Wt 60.5 kg (133 lb 4.8 oz)  LMP 2017 (Approximate)  BMI 23.24 kg/m2    Pelvic Exam:  EG/BUS: Normal genital architecture without lesions, erythema or abnormal secretions. Bartholin's, Urethra, [place]'s glands are normal.   Vagina: moist, pink, rugae with creamy, white and odorlesssecretions  Cervix: Nulliparous, and no lesions  Rectum:anus normal    PROCEDURE:    Acetic acid and/or Lugol's solution applied to cervix.  Colposcopic exam of the cervix and apex of the " vagina was conducted in the usual fashion.     Findings:  SCJ was NOT seen entirely and the exam UNsatisfactory.    There were no visible lesions    ECC: was obtained and placed in labeled Formalin Jar.       The cervix was prepped with Technicare.  A single tooth tenaculum was applied to the anterior lip of the cervix. The endometrial pipelle was  NOT ABLE to advanced through the cervix, despite attempting dilators.  The tenaculum was removed from the cervix and the tenaculum site made hemostatic with Silver Nitrate sticks .    EBL: 5cc    Complications:  none  Pathology: EMB sample was sent to pathology.  Tolerance of Procedure:  Patient did tolerate the procedure well.    Assessment: Normal exam without visible pathology    Plan:   Plan for  U/s and then EMB.  Pt to call back to schedule EMB during next menses and to take cytotec 2 hours prior to procedure.     Biopsies sent to pathology.  Will contact patient with results and recommended follow-up plan.      Patient advised to contact clinic with heavy vaginal bleeding, fever over 101 degrees F, or any other concerns.    Advised that evaluation of sexual contacts is NOT warranted as she can not get the same virus again. Risk of exposure to a NEW virus is possible with partner change.    Verbalized understanding and agreement with plan.    Again, thank you for allowing me to participate in the care of your patient.      Sincerely,    Kayla Harris MD

## 2017-06-27 ENCOUNTER — OFFICE VISIT (OUTPATIENT)
Dept: OBGYN | Facility: CLINIC | Age: 47
End: 2017-06-27
Payer: COMMERCIAL

## 2017-06-27 DIAGNOSIS — R87.619 ATYPICAL GLANDULAR CELLS ON CERVICAL PAP SMEAR: ICD-10-CM

## 2017-06-27 LAB — COPATH REPORT: NORMAL

## 2017-06-27 NOTE — MR AVS SNAPSHOT
After Visit Summary   2017    Mary Pearson    MRN: [id number]           Patient Information     Date Of Birth          1970        Visit Information        Provider Department      2017 8:00 AM [facility] ULTRASOUND [facility]        Today's Diagnoses     Atypical glandular cells on cervical Pap smear           Follow-ups after your visit        Your next 10 appointments already scheduled     2017  2:00 PM CDT   Return Visit with Amy Schumer, MD   [facility] ([facility])    [facility] [address]  [address]  [address]  [facility] [id number] [phone]              Who to contact     Please call your clinic at [phone] to:    Ask questions about your health    Make or cancel appointments    Discuss your medicines    Learn about your test results    Speak to your doctor   If you have compliments or concerns about an experience at your clinic, or if you wish to file a complaint, please contact [facility] Patient Relations at [phone] or email us at [email or web address]         Additional Information About Your Visit        MyChart Information     Sanook is an electronic gateway that provides easy, online access to your medical records. With Sanook, you can request a clinic appointment, read your test results, renew a prescription or communicate with your care team.     To sign up for Sanook visit the website at [email or web address]   You will be asked to enter the access code listed below, as well as some personal information. Please follow the directions to create your username and password.     Your access code is: [email or web address]  Expires: 2017  8:13 AM     Your access code will  in 90 days. If you need help or a new code, please contact your [facility] or call [phone] for assistance.        Care  EveryWhere ID     This is your Care EveryWhere ID. This could be used by other organizations to access your [facility] medical records  [id number]        Your Vitals Were     Last Period                   06/02/2017 (Approximate)            Blood Pressure from Last 3 Encounters:   06/23/17 92/65   04/25/17 96/60    Weight from Last 3 Encounters:   06/23/17 60.5 kg (133 lb 4.8 oz)   04/25/17 60.6 kg (133 lb 9.6 oz)              We Performed the Following     US Pelvic Complete w Transvaginal        Primary Care Provider    None Specified       No primary provider on file.        Equal Access to Services     [facility]: Hadii jamir Love, walopezda lesly, moni crain, ramona tuttle . So [facility] [phone].    ATENCIÓN: Si habla español, tiene a gilman disposición servicios gratuitos de asistencia lingüística. Llame al [phone].    We comply with applicable federal civil rights laws and [place] laws. We do not discriminate on the basis of race, color, national origin, age, disability sex, sexual orientation or gender identity.            Thank you!     Thank you for choosing [facility]  for your care. Our goal is always to provide you with excellent care. Hearing back from our patients is one way we can continue to improve our services. Please take a few minutes to complete the written survey that you may receive in the mail after your visit with us. Thank you!             Your Updated Medication List - Protect others around you: Learn how to safely use, store and throw away your medicines at www.disposemymeds.org.          This list is accurate as of: 6/27/17 11:59 PM.  Always use your most recent med list.                   Brand Name Dispense Instructions for use Diagnosis    biotin 1000 MCG Tabs tablet      Take 1,000 mcg by mouth daily Takes 2,500mcg        * clindamycin 1 % lotion    CLEOCIN T     Apply topically 2 times daily        *  clindamycin 1 % topical gel    CLINDAMAX    30 g    Apply to affected areas on face bid    Acne vulgaris       Doxycycline Hyclate 50 MG Tabs     30 tablet    Take 50 mg by mouth daily    Rosacea       escitalopram 10 MG tablet    LEXAPRO     Take 10 mg by mouth daily        LEVOTHYROXINE SODIUM PO      Take 88 mcg by mouth daily        * METROCREAM 0.75 % cream   Generic drug:  metroNIDAZOLE      Apply topically 2 times daily        * metroNIDAZOLE 1 % gel    METROGEL    60 g    Apply topically daily Apply to affected areas on face bid    Rosacea       misoprostol 200 MCG tablet    CYTOTEC    2 tablet    Take 2 hours before procedure. Place 1 tab between cheek and gum on the right, and 2nd tab on the left. Dissolve for 30 min, then swallow.    Abnormal glandular Papanicolaou smear of cervix       PROBIOTIC ADVANCED PO           * Notice:  This list has 4 medication(s) that are the same as other medications prescribed for you. Read the directions carefully, and ask your doctor or other care provider to review them with you.

## 2017-06-27 NOTE — PROGRESS NOTES
47 year old female with LMP 6/24/17 presents for gynecologic ultrasound indicated by atypical glandular cells on cervical Pap smear .  This study was done transvaginally.    Uterine findings:   Presence: Visible Size: Normal 4.5 x 3.6 x 3.0 cm.  Endometrium = 5.6 mm.   Cx length = 27.5 mm.      Flexion:  Anteverted Position: Midline Margins: Smooth Shape: Normal   Contour: Regular Texture: Heterogeneous Cavity: Normal Masses: Abnormal  Anterior mid myoma- 1.1 x 0.9 x 1.0cm  Anterior right myoma- 0.8 x 0.9 x 1.0cm    Pelvic findings:    Right Adnexa: Normal   Left Adnexa: Normal   Bladder:  Normal         Cul - de - sac fluid: None    Ovarian follicles:   Right ovary:  3.2 x 2.7 x 2.1cm.     1 follicle     Size(s):  1.9 x 1.7 x 1.6cm     Left ovary:  Not visualized        Comments:  2 small 1 cm anterior fibroids.  No other abnormal structural findings.  Left ovary not visualized.    DILEEP Bosch MD

## 2017-06-27 NOTE — LETTER
6/27/2017      RE: Mary Pearson  [address]  [place] [address]       47 year old female with LMP 6/24/17 presents for gynecologic ultrasound indicated by atypical glandular cells on cervical Pap smear .  This study was done transvaginally.    Uterine findings:   Presence: Visible Size: Normal 4.5 x 3.6 x 3.0 cm.  Endometrium = 5.6 mm.   Cx length = 27.5 mm.      Flexion:  Anteverted Position: Midline Margins: Smooth Shape: Normal   Contour: Regular Texture: Heterogeneous Cavity: Normal Masses: Abnormal  Anterior mid myoma- 1.1 x 0.9 x 1.0cm  Anterior right myoma- 0.8 x 0.9 x 1.0cm    Pelvic findings:    Right Adnexa: Normal   Left Adnexa: Normal   Bladder:  Normal         Cul - de - sac fluid: None    Ovarian follicles:   Right ovary:  3.2 x 2.7 x 2.1cm.     1 follicle     Size(s):  1.9 x 1.7 x 1.6cm     Left ovary:  Not visualized        Comments:  2 small 1 cm anterior fibroids.  No other abnormal structural findings.  Left ovary not visualized.    DILEEP Bosch MD    S Ultrasound

## 2017-06-30 ENCOUNTER — OFFICE VISIT (OUTPATIENT)
Dept: OBGYN | Facility: CLINIC | Age: 47
End: 2017-06-30
Payer: COMMERCIAL

## 2017-06-30 VITALS
HEIGHT: 64 IN | WEIGHT: 134.2 LBS | SYSTOLIC BLOOD PRESSURE: 110 MMHG | DIASTOLIC BLOOD PRESSURE: 70 MMHG | HEART RATE: 72 BPM | BODY MASS INDEX: 22.91 KG/M2

## 2017-06-30 DIAGNOSIS — R87.619 ATYPICAL GLANDULAR CELLS OF UNDETERMINED SIGNIFICANCE (AGUS) ON CERVICAL PAP SMEAR: Primary | ICD-10-CM

## 2017-06-30 PROCEDURE — 88305 TISSUE EXAM BY PATHOLOGIST: CPT | Performed by: STUDENT IN AN ORGANIZED HEALTH CARE EDUCATION/TRAINING PROGRAM

## 2017-06-30 PROCEDURE — 58100 BIOPSY OF UTERUS LINING: CPT | Mod: ZF | Performed by: STUDENT IN AN ORGANIZED HEALTH CARE EDUCATION/TRAINING PROGRAM

## 2017-06-30 PROCEDURE — 99212 OFFICE O/P EST SF 10 MIN: CPT | Mod: ZF

## 2017-06-30 ASSESSMENT — PAIN SCALES - GENERAL: PAINLEVEL: NO PAIN (0)

## 2017-06-30 NOTE — MR AVS SNAPSHOT
"              After Visit Summary   2017    Mary Pearson    MRN: [id number]           Patient Information     Date Of Birth          1970        Visit Information        Provider Department      2017 2:00 PM Schumer, Amy, MD [facility]        Today's Diagnoses     Atypical glandular cells of undetermined significance (RONAN) on cervical Pap smear    -  1       Follow-ups after your visit        Who to contact     Please call your clinic at [phone] to:    Ask questions about your health    Make or cancel appointments    Discuss your medicines    Learn about your test results    Speak to your doctor   If you have compliments or concerns about an experience at your clinic, or if you wish to file a complaint, please contact [facility] Patient Relations at [phone] or email us at [email or web address]         Additional Information About Your Visit        MyChart Information     MedPro is an electronic gateway that provides easy, online access to your medical records. With MedPro, you can request a clinic appointment, read your test results, renew a prescription or communicate with your care team.     To sign up for MedPro visit the website at [email or web address]   You will be asked to enter the access code listed below, as well as some personal information. Please follow the directions to create your username and password.     Your access code is: [email or web address]  Expires: 2017  8:13 AM     Your access code will  in 90 days. If you need help or a new code, please contact your [facility] or call [phone] for assistance.        Care EveryWhere ID     This is your Care EveryWhere ID. This could be used by other organizations to access your [facility] medical records  [id number]        Your Vitals Were     Pulse Height Last Period BMI (Body Mass Index)          72 1.613 m (5' 3.5\") " 06/26/2017 23.4 kg/m2         Blood Pressure from Last 3 Encounters:   06/30/17 110/70   06/23/17 92/65   04/25/17 96/60    Weight from Last 3 Encounters:   06/30/17 60.9 kg (134 lb 3.2 oz)   06/23/17 60.5 kg (133 lb 4.8 oz)   04/25/17 60.6 kg (133 lb 9.6 oz)              We Performed the Following     Endometrial Biopsy without Cervical Dilation [13743]     hCG qual urine POCT [POC7]     Surgical pathology exam [CTL1001]        Primary Care Provider    None Specified       No primary provider on file.        Equal Access to Services     [facility] : Hadreginald Love, isabel grace, moni crain, ramona tuttle . So [facility] [phone].    ATENCIÓN: Si habla español, tiene a gilman disposición servicios gratuitos de asistencia lingüística. Llame al [phone].    We comply with applicable federal civil rights laws and [place] laws. We do not discriminate on the basis of race, color, national origin, age, disability sex, sexual orientation or gender identity.            Thank you!     Thank you for choosing [facility]  for your care. Our goal is always to provide you with excellent care. Hearing back from our patients is one way we can continue to improve our services. Please take a few minutes to complete the written survey that you may receive in the mail after your visit with us. Thank you!             Your Updated Medication List - Protect others around you: Learn how to safely use, store and throw away your medicines at www.disposemymeds.org.          This list is accurate as of: 6/30/17 11:59 PM.  Always use your most recent med list.                   Brand Name Dispense Instructions for use Diagnosis    biotin 1000 MCG Tabs tablet      Take 1,000 mcg by mouth daily Takes 2,500mcg        * clindamycin 1 % lotion    CLEOCIN T     Apply topically 2 times daily        * clindamycin 1 % topical gel    CLINDAMAX    30 g    Apply to affected  areas on face bid    Acne vulgaris       Doxycycline Hyclate 50 MG Tabs     30 tablet    Take 50 mg by mouth daily    Rosacea       escitalopram 10 MG tablet    LEXAPRO     Take 10 mg by mouth daily        LEVOTHYROXINE SODIUM PO      Take 88 mcg by mouth daily        * METROCREAM 0.75 % cream   Generic drug:  metroNIDAZOLE      Apply topically 2 times daily        * metroNIDAZOLE 1 % gel    METROGEL    60 g    Apply topically daily Apply to affected areas on face bid    Rosacea       misoprostol 200 MCG tablet    CYTOTEC    2 tablet    Take 2 hours before procedure. Place 1 tab between cheek and gum on the right, and 2nd tab on the left. Dissolve for 30 min, then swallow.    Abnormal glandular Papanicolaou smear of cervix       PROBIOTIC ADVANCED PO           * Notice:  This list has 4 medication(s) that are the same as other medications prescribed for you. Read the directions carefully, and ask your doctor or other care provider to review them with you.

## 2017-06-30 NOTE — PROGRESS NOTES
"  Endometrial Biopsy    Menstrual History:  Menstrual History 2017   LAST MENSTRUAL PERIOD 2017       Time Out - \"Pause for the Cause\"  Just before the procedure begins, through verbal and active participation of team members, verify:                      Initials   Patient Name Mary Pearson   Patient  [birth year]   Procedure to be performed Endometrial biopsy                                                                                                                                      Indication: RONAN  Faculty:  I was present with the resident throughout the entire procedure. My additional comments are:stenotic cervix required me to perform dilation and biopsy.      Pregnancy test:  negative    Consent: Written consent signed and scanned into medical record.    Using a medium Kendrick speculum, the cervix was visualized. The cervix was prepped with Betadine.  1 cc 1% lidocaine was injected into the cervix at 12 o'clock. A single tooth tenaculum was applied to the anterior lip of the cervix.  2cc 1% lidocaine were injected into the cervix at 10 o'clock and 4 o'clock.  The cervix was dilated to 7 using Ellington dilators.  The endometrial pipelle was advanced through the cervix without difficulty and a sample collected. No additional pass was made.  The tenaculum was removed from the cervix and the tenaculum site made hemostatic with pressure.    EBL: 15 cc    Complications:  none  Pathology: EMB sample was sent to pathology.  Tolerance of Procedure:  Patient did tolerate the procedure well.     Patient was instructed to call if she experiences any heavy bleeding, severe cramping, or abnormal vaginal discharge.  May take ibuprofen 400-800 mg PO TID PRN or naproxen 500 mg PO BID for cramping.    Will notify patient of results.    Amy Schumer, MD  Ob/Gyn PGY-1     Alejandra Mclaughlin    "

## 2017-07-07 LAB — COPATH REPORT: NORMAL

## 2017-07-13 ENCOUNTER — MYC MEDICAL ADVICE (OUTPATIENT)
Dept: FAMILY MEDICINE | Facility: CLINIC | Age: 47
End: 2017-07-13

## 2017-07-13 DIAGNOSIS — E03.9 HYPOTHYROIDISM, UNSPECIFIED TYPE: Primary | ICD-10-CM

## 2017-07-13 DIAGNOSIS — L70.0 ACNE VULGARIS: ICD-10-CM

## 2017-07-13 DIAGNOSIS — L71.9 ROSACEA: ICD-10-CM

## 2017-07-13 NOTE — TELEPHONE ENCOUNTER
Please see Health in Reach message and advise.      Thank you,  Francine Ochoa,RN  [facility]  [phone]          Synthroid     Last Written Prescription Date: na  Last Quantity: na, # refills: na  Last Office Visit with [facility], P or [facility] prescribing provider: 04/25/17        TSH   Date Value Ref Range Status   11/28/2016 2.26 0.33 - 4.70 uIU/mL Final

## 2017-07-13 NOTE — TELEPHONE ENCOUNTER
Please see [employer] message and advise. Needs written prescriptions for new mail order      Thank you,  Francine OchoaRN  [facility]  [phone]      doxycycline      Last Written Prescription Date: 05/03/17  Last Fill Quantity: 30,  # refills: 6   Last Office Visit with [facility], [facility] or [facility] prescribing provider: 05/03/17              metronidazole      Last Written Prescription Date: 05/03/17  Last Fill Quantity: 60g,  # refills: 11   Last Office Visit with [facility], [facility] or  Health prescribing provider: 05/03/17                                                                     clindamycin      Last Written Prescription Date: 05/03/17  Last Fill Quantity: 30 g,  # refills: 11   Last Office Visit with [facility], [facility] or  [facility] prescribing provider: 05/03/17

## 2017-07-14 ENCOUNTER — TELEPHONE (OUTPATIENT)
Dept: FAMILY MEDICINE | Facility: CLINIC | Age: 47
End: 2017-07-14

## 2017-07-14 ENCOUNTER — MYC MEDICAL ADVICE (OUTPATIENT)
Dept: FAMILY MEDICINE | Facility: CLINIC | Age: 47
End: 2017-07-14

## 2017-07-14 DIAGNOSIS — Z53.9 ERRONEOUS ENCOUNTER--DISREGARD: Primary | ICD-10-CM

## 2017-07-14 RX ORDER — CLINDAMYCIN PHOSPHATE 10 MG/G
GEL TOPICAL
Qty: 30 G | Refills: 11 | Status: SHIPPED | OUTPATIENT
Start: 2017-07-14

## 2017-07-14 RX ORDER — METRONIDAZOLE 10 MG/G
GEL TOPICAL DAILY
Qty: 60 G | Refills: 3 | Status: SHIPPED | OUTPATIENT
Start: 2017-07-14

## 2017-07-14 RX ORDER — DOXYCYCLINE HYCLATE 50 MG/1
50 TABLET, FILM COATED ORAL DAILY
Qty: 90 TABLET | Refills: 3 | Status: SHIPPED | OUTPATIENT
Start: 2017-07-14 | End: 2017-10-10

## 2017-07-14 RX ORDER — LEVOTHYROXINE SODIUM 88 UG/1
88 CAPSULE ORAL DAILY
Qty: 90 CAPSULE | Refills: 1 | Status: SHIPPED | OUTPATIENT
Start: 2017-07-14 | End: 2017-11-28

## 2017-07-14 NOTE — TELEPHONE ENCOUNTER
Script in T1 TC bin  Waiting on patient response as to if she wants to  and want us to mail  Amina WHITAKER

## 2017-07-17 ENCOUNTER — TELEPHONE (OUTPATIENT)
Dept: FAMILY MEDICINE | Facility: CLINIC | Age: 47
End: 2017-07-17

## 2017-07-17 NOTE — TELEPHONE ENCOUNTER
Called pharmacy and gave Verbal ok to change from tabs to capsules per [facility] protocol.    Francine Ochoa RN  [facility]  [phone]

## 2017-07-17 NOTE — TELEPHONE ENCOUNTER
LINETTE, office notes and Prescription placed at  for patient to   LINETTE to be signed and given back to TC  Amina WHITAKER

## 2017-07-17 NOTE — TELEPHONE ENCOUNTER
Pharmacy calling re: Doxycycline Hyclate 50 MG TABS. They do not have tablets, can we change the RX to Capsules? Please advise.  Ph: [phone] [facility] pharmacy  Thank you  Bibi Pandya

## 2017-08-25 ENCOUNTER — OFFICE VISIT (OUTPATIENT)
Dept: FAMILY MEDICINE | Facility: CLINIC | Age: 47
End: 2017-08-25
Payer: COMMERCIAL

## 2017-08-25 VITALS
WEIGHT: 136.6 LBS | SYSTOLIC BLOOD PRESSURE: 102 MMHG | TEMPERATURE: 97.5 F | HEART RATE: 98 BPM | DIASTOLIC BLOOD PRESSURE: 70 MMHG | BODY MASS INDEX: 23.32 KG/M2 | HEIGHT: 64 IN

## 2017-08-25 DIAGNOSIS — R53.83 FATIGUE, UNSPECIFIED TYPE: Primary | ICD-10-CM

## 2017-08-25 DIAGNOSIS — M43.6 STIFFNESS OF NECK: ICD-10-CM

## 2017-08-25 DIAGNOSIS — E03.9 ACQUIRED HYPOTHYROIDISM: ICD-10-CM

## 2017-08-25 DIAGNOSIS — J06.9 VIRAL UPPER RESPIRATORY TRACT INFECTION: ICD-10-CM

## 2017-08-25 LAB
BASOPHILS # BLD AUTO: 0 10E9/L (ref 0–0.2)
BASOPHILS NFR BLD AUTO: 0.3 %
DIFFERENTIAL METHOD BLD: NORMAL
EOSINOPHIL # BLD AUTO: 0.3 10E9/L (ref 0–0.7)
EOSINOPHIL NFR BLD AUTO: 3.2 %
ERYTHROCYTE [DISTWIDTH] IN BLOOD BY AUTOMATED COUNT: 13.4 % (ref 10–15)
HCT VFR BLD AUTO: 37.4 % (ref 35–47)
HGB BLD-MCNC: 12.5 G/DL (ref 11.7–15.7)
LYMPHOCYTES # BLD AUTO: 1.9 10E9/L (ref 0.8–5.3)
LYMPHOCYTES NFR BLD AUTO: 18.9 %
MCH RBC QN AUTO: 31 PG (ref 26.5–33)
MCHC RBC AUTO-ENTMCNC: 33.4 G/DL (ref 31.5–36.5)
MCV RBC AUTO: 93 FL (ref 78–100)
MONOCYTES # BLD AUTO: 0.5 10E9/L (ref 0–1.3)
MONOCYTES NFR BLD AUTO: 5.3 %
NEUTROPHILS # BLD AUTO: 7.1 10E9/L (ref 1.6–8.3)
NEUTROPHILS NFR BLD AUTO: 72.3 %
PLATELET # BLD AUTO: 349 10E9/L (ref 150–450)
RBC # BLD AUTO: 4.03 10E12/L (ref 3.8–5.2)
WBC # BLD AUTO: 9.9 10E9/L (ref 4–11)

## 2017-08-25 PROCEDURE — 99214 OFFICE O/P EST MOD 30 MIN: CPT | Performed by: FAMILY MEDICINE

## 2017-08-25 PROCEDURE — 36415 COLL VENOUS BLD VENIPUNCTURE: CPT | Performed by: FAMILY MEDICINE

## 2017-08-25 PROCEDURE — 80050 GENERAL HEALTH PANEL: CPT | Performed by: FAMILY MEDICINE

## 2017-08-25 ASSESSMENT — ANXIETY QUESTIONNAIRES
6. BECOMING EASILY ANNOYED OR IRRITABLE: NOT AT ALL
2. NOT BEING ABLE TO STOP OR CONTROL WORRYING: NOT AT ALL
GAD7 TOTAL SCORE: 0
1. FEELING NERVOUS, ANXIOUS, OR ON EDGE: NOT AT ALL
3. WORRYING TOO MUCH ABOUT DIFFERENT THINGS: NOT AT ALL
5. BEING SO RESTLESS THAT IT IS HARD TO SIT STILL: NOT AT ALL
IF YOU CHECKED OFF ANY PROBLEMS ON THIS QUESTIONNAIRE, HOW DIFFICULT HAVE THESE PROBLEMS MADE IT FOR YOU TO DO YOUR WORK, TAKE CARE OF THINGS AT HOME, OR GET ALONG WITH OTHER PEOPLE: NOT DIFFICULT AT ALL
7. FEELING AFRAID AS IF SOMETHING AWFUL MIGHT HAPPEN: NOT AT ALL

## 2017-08-25 ASSESSMENT — PATIENT HEALTH QUESTIONNAIRE - PHQ9
SUM OF ALL RESPONSES TO PHQ QUESTIONS 1-9: 4
5. POOR APPETITE OR OVEREATING: NOT AT ALL

## 2017-08-25 NOTE — LETTER
[facility]          [address] [place] [address]                            [phone]  Fax: [phone]    Mary Pearson  [address]  [place] [address]    [id number]    August 25, 2017      To whom it may concern    Please excuse Mary Pearson from work on 8/25/17 due to illness.  If you have any other questions or concerns please feel free to contact me at anytime.        Sincerely,      Paul Curry M.D.

## 2017-08-25 NOTE — PROGRESS NOTES
SUBJECTIVE:   Mary Pearson is a 47 year old female who presents to clinic today for the following health issues:      Concern - Concerns of meningitis   Onset: 3 weeks    Description:   Stiff neck, fatigue, nausea, diarrhea, headache, sore throat, chills     Intensity: mild, moderate    Progression of Symptoms:  worsening    Accompanying Signs & Symptoms:      Previous history of similar problem:       Precipitating factors:   Worsened by: no    Alleviating factors:  Improved by: no    Therapies Tried and outcome: imodium stopped the diarrhea 2 days ago.   Sore throat -mild, mostly at night.   Vision is normal.   Mild temporal HA.   No other sick contacts.   No fever.  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   Stiff neck started about 1 week ago. No numbness, tingling or weakness.   No rash.   C/o fatigue.       Problem list and histories reviewed & adjusted, as indicated.  Additional history: as documented    Patient Active Problem List   Diagnosis     Atypical glandular cells on cervical Pap smear     Acne vulgaris     Acquired hypothyroidism     Major depressive disorder with single episode, in remission (H)     DUB (dysfunctional uterine bleeding) - on OCP     Past Surgical History:   Procedure Laterality Date     AS REDUCTION OF LARGE BREAST         Social History   Substance Use Topics     Smoking status: Never Smoker     Smokeless tobacco: Never Used     Alcohol use Yes      Comment: once a month     Family History   Problem Relation Age of Onset     Colon Cancer Mother 49     DIABETES Father          Current Outpatient Prescriptions   Medication Sig Dispense Refill     clindamycin (CLINDAMAX) 1 % topical gel Apply to affected areas on face bid 30 g 11     Doxycycline Hyclate 50 MG TABS Take 50 mg by mouth daily 90 tablet 3     metroNIDAZOLE (METROGEL) 1 % gel Apply topically daily Apply to affected areas on face bid 60 g 3     Levothyroxine Sodium 88 MCG CAPS Take 88 mcg by mouth daily 90 capsule 1     biotin 1000  "MCG TABS tablet Take 1,000 mcg by mouth daily Takes 2,500mcg       Probiotic Product (PROBIOTIC ADVANCED PO)        Allergies   Allergen Reactions     Nickel Rash         Reviewed and updated as needed this visit by clinical staffTobacco  Allergies  Meds       Reviewed and updated as needed this visit by Provider         ROS:  INTEGUMENTARY/SKIN: NEGATIVE for worrisome rashes, moles or lesions  EYES: NEGATIVE for vision changes or irritation  CV: NEGATIVE for chest pain, palpitations or peripheral edema  GI: NEGATIVE for nausea, abdominal pain, heartburn, or change in bowel habits    OBJECTIVE:                                                    /70  Pulse 98  Temp 97.5  F (36.4  C) (Tympanic)  Ht 5' 3.5\" (1.613 m)  Wt 136 lb 9.6 oz (62 kg)  BMI 23.82 kg/m2  Body mass index is 23.82 kg/(m^2).   GENERAL: healthy, alert, well nourished, well hydrated, no distress  EYES: Eyes grossly normal to inspection, extraocular movements - intact, and PERRL  HENT: ear canals- normal; TMs- normal; Nose- normal; Mouth- no ulcers, no lesions  NECK: no tenderness, no adenopathy, no asymmetry, no masses, no stiffness; thyroid- normal to palpation  RESP: lungs clear to auscultation - no rales, no rhonchi, no wheezes  CV: regular rates and rhythm, normal S1 S2, no S3 or S4 and no murmur, no click or rub -  ABDOMEN: soft, no tenderness, no  hepatosplenomegaly, no masses, normal bowel sounds  SKIN: no suspicious lesions, no rashes  NEURO: strength and tone- normal, sensory exam- grossly normal, mentation- intact, speech- normal, reflexes- symmetric. Negative Brudzinski and Kernig's sign.    Results for orders placed or performed in visit on 08/25/17   TSH   Result Value Ref Range    TSH 1.00 0.40 - 4.00 mU/L   CBC with platelets differential   Result Value Ref Range    WBC 9.9 4.0 - 11.0 10e9/L    RBC Count 4.03 3.8 - 5.2 10e12/L    Hemoglobin 12.5 11.7 - 15.7 g/dL    Hematocrit 37.4 35.0 - 47.0 %    MCV 93 78 - 100 fl    MCH " 31.0 26.5 - 33.0 pg    MCHC 33.4 31.5 - 36.5 g/dL    RDW 13.4 10.0 - 15.0 %    Platelet Count 349 150 - 450 10e9/L    Diff Method Automated Method     % Neutrophils 72.3 %    % Lymphocytes 18.9 %    % Monocytes 5.3 %    % Eosinophils 3.2 %    % Basophils 0.3 %    Absolute Neutrophil 7.1 1.6 - 8.3 10e9/L    Absolute Lymphocytes 1.9 0.8 - 5.3 10e9/L    Absolute Monocytes 0.5 0.0 - 1.3 10e9/L    Absolute Eosinophils 0.3 0.0 - 0.7 10e9/L    Absolute Basophils 0.0 0.0 - 0.2 10e9/L   Comprehensive metabolic panel   Result Value Ref Range    Sodium 142 133 - 144 mmol/L    Potassium 3.7 3.4 - 5.3 mmol/L    Chloride 111 (H) 94 - 109 mmol/L    Carbon Dioxide 24 20 - 32 mmol/L    Anion Gap 7 3 - 14 mmol/L    Glucose 74 70 - 99 mg/dL    Urea Nitrogen 14 7 - 30 mg/dL    Creatinine 1.03 0.52 - 1.04 mg/dL    GFR Estimate 58 (L) >60 mL/min/1.7m2    GFR Estimate If Black 70 >60 mL/min/1.7m2    Calcium 9.3 8.5 - 10.1 mg/dL    Bilirubin Total 0.6 0.2 - 1.3 mg/dL    Albumin 3.9 3.4 - 5.0 g/dL    Protein Total 6.9 6.8 - 8.8 g/dL    Alkaline Phosphatase 58 40 - 150 U/L    ALT 29 0 - 50 U/L    AST 26 0 - 45 U/L          ASSESSMENT/PLAN:                                                        ICD-10-CM    1. Fatigue, unspecified type R53.83 CBC with platelets differential     Comprehensive metabolic panel   2. Viral upper respiratory tract infection J06.9     B97.89    3. Acquired hypothyroidism E03.9 TSH   4. Stiffness of neck M43.6      -Liver and gallbladder tests (ALT,AST, Alk phos,bilirubin) are normal.   -Kidney function (GFR) is decreased but stable as compared to before.  ADVISE: Drink more water to keep your body hydrated well. Follow up to recheck in 6 months   -Sodium is normal.   -Potassium is normal.   -Glucose (diabetic screening test) is normal.   -TSH (thyroid stimulating hormone) level is normal which indicates normal thyroid function.   -Normal red blood cell (hgb) levels, normal white blood cell count and normal platelet  levels.   Recommended to use ibuprofen prn pain in the neck. No signs of meningitis.   No signs of bacterial URI or lung infection. Patient reassured. Symptoms are likely viral in nature.   Fatigue is likely due to viral illness and dehydration.     Follow up with Provider - in 2 weeks, if symptoms are not improving.      Patricio Miller MD  [facility]

## 2017-08-25 NOTE — MR AVS SNAPSHOT
"              After Visit Summary   8/25/2017    Mary Pearson    MRN: [id number]           Patient Information     Date Of Birth          1970        Visit Information        Provider Department      8/25/2017 9:40 AM Patricio Miller MD [facility] [place]        Today's Diagnoses     Fatigue, unspecified type    -  1    Viral upper respiratory tract infection        Acquired hypothyroidism        Stiffness of neck           Follow-ups after your visit        Who to contact     If you have questions or need follow up information about today's clinic visit or your schedule please contact [facility] [place] directly at [phone].  Normal or non-critical lab and imaging results will be communicated to you by Gentronixhart, letter or phone within 4 business days after the clinic has received the results. If you do not hear from us within 7 days, please contact the clinic through Gentronixhart or phone. If you have a critical or abnormal lab result, we will notify you by phone as soon as possible.  Submit refill requests through XOXO Kitchen or call your pharmacy and they will forward the refill request to us. Please allow 3 business days for your refill to be completed.          Additional Information About Your Visit        MyChart Information     X[employer] gives you secure access to your electronic health record. If you see a primary care provider, you can also send messages to your care team and make appointments. If you have questions, please call your primary care clinic.  If you do not have a primary care provider, please call [phone] and they will assist you.        Care EveryWhere ID     This is your Care EveryWhere ID. This could be used by other organizations to access your [place] medical records  [id number]        Your Vitals Were     Pulse Temperature Height BMI (Body Mass Index)          98 97.5  F (36.4  C) (Tympanic) 5' 3.5\" (1.613 m) 23.82 kg/m2         Blood Pressure from Last 3 " Encounters:   08/25/17 102/70   06/30/17 110/70   06/23/17 92/65    Weight from Last 3 Encounters:   08/25/17 136 lb 9.6 oz (62 kg)   06/30/17 134 lb 3.2 oz (60.9 kg)   06/23/17 133 lb 4.8 oz (60.5 kg)              We Performed the Following     CBC with platelets differential     Comprehensive metabolic panel     TSH          Today's Medication Changes          These changes are accurate as of: 8/25/17 10:02 AM.  If you have any questions, ask your nurse or doctor.               These medicines have changed or have updated prescriptions.        Dose/Directions    metroNIDAZOLE 1 % gel   Commonly known as:  METROGEL   This may have changed:  Another medication with the same name was removed. Continue taking this medication, and follow the directions you see here.   Used for:  Rosacea   Changed by:  Bia Virgen MD        Apply topically daily Apply to affected areas on face bid   Quantity:  60 g   Refills:  3                Primary Care Provider Office Phone # Fax #    Patricio Miller [phone]       [address]  [place] [address]        Equal Access to Services     [facility] AH: Hadii jamir john Sovirgie, waaxda lutony, qaybta kaalmada paras, ramona tuttle . So [facility] [phone].    ATENCIÓN: Si habla español, tiene a gilman disposición servicios gratuitos de asistencia lingüística. Llame al [phone].    We comply with applicable federal civil rights laws and [place] laws. We do not discriminate on the basis of race, color, national origin, age, disability sex, sexual orientation or gender identity.            Thank you!     Thank you for choosing [facility] [place]  for your care. Our goal is always to provide you with excellent care. Hearing back from our patients is one way we can continue to improve our services. Please take a few minutes to complete the written survey that you may receive in the mail after your visit  with us. Thank you!             Your Updated Medication List - Protect others around you: Learn how to safely use, store and throw away your medicines at www.disposemymeds.org.          This list is accurate as of: 8/25/17 10:02 AM.  Always use your most recent med list.                   Brand Name Dispense Instructions for use Diagnosis    biotin 1000 MCG Tabs tablet      Take 1,000 mcg by mouth daily Takes 2,500mcg        clindamycin 1 % topical gel    CLINDAMAX    30 g    Apply to affected areas on face bid    Acne vulgaris       Doxycycline Hyclate 50 MG Tabs     90 tablet    Take 50 mg by mouth daily    Rosacea       Levothyroxine Sodium 88 MCG Caps     90 capsule    Take 88 mcg by mouth daily    Hypothyroidism, unspecified type       metroNIDAZOLE 1 % gel    METROGEL    60 g    Apply topically daily Apply to affected areas on face bid    Rosacea       PROBIOTIC ADVANCED PO

## 2017-08-25 NOTE — NURSING NOTE
"Chief Complaint   Patient presents with     Nausea       Initial /70  Pulse 98  Temp 97.5  F (36.4  C) (Tympanic)  Ht 5' 3.5\" (1.613 m)  Wt 136 lb 9.6 oz (62 kg)  BMI 23.82 kg/m2 Estimated body mass index is 23.82 kg/(m^2) as calculated from the following:    Height as of this encounter: 5' 3.5\" (1.613 m).    Weight as of this encounter: 136 lb 9.6 oz (62 kg).  Medication Reconciliation: complete  "

## 2017-08-26 LAB
ALBUMIN SERPL-MCNC: 3.9 G/DL (ref 3.4–5)
ALP SERPL-CCNC: 58 U/L (ref 40–150)
ALT SERPL W P-5'-P-CCNC: 29 U/L (ref 0–50)
ANION GAP SERPL CALCULATED.3IONS-SCNC: 7 MMOL/L (ref 3–14)
AST SERPL W P-5'-P-CCNC: 26 U/L (ref 0–45)
BILIRUB SERPL-MCNC: 0.6 MG/DL (ref 0.2–1.3)
BUN SERPL-MCNC: 14 MG/DL (ref 7–30)
CALCIUM SERPL-MCNC: 9.3 MG/DL (ref 8.5–10.1)
CHLORIDE SERPL-SCNC: 111 MMOL/L (ref 94–109)
CO2 SERPL-SCNC: 24 MMOL/L (ref 20–32)
CREAT SERPL-MCNC: 1.03 MG/DL (ref 0.52–1.04)
GFR SERPL CREATININE-BSD FRML MDRD: 58 ML/MIN/1.7M2
GLUCOSE SERPL-MCNC: 74 MG/DL (ref 70–99)
POTASSIUM SERPL-SCNC: 3.7 MMOL/L (ref 3.4–5.3)
PROT SERPL-MCNC: 6.9 G/DL (ref 6.8–8.8)
SODIUM SERPL-SCNC: 142 MMOL/L (ref 133–144)
TSH SERPL DL<=0.005 MIU/L-ACNC: 1 MU/L (ref 0.4–4)

## 2017-08-26 ASSESSMENT — ANXIETY QUESTIONNAIRES: GAD7 TOTAL SCORE: 0

## 2017-08-31 DIAGNOSIS — R19.7 DIARRHEA, UNSPECIFIED TYPE: ICD-10-CM

## 2017-08-31 PROCEDURE — 87506 IADNA-DNA/RNA PROBE TQ 6-11: CPT | Performed by: FAMILY MEDICINE

## 2017-09-09 ENCOUNTER — MYC MEDICAL ADVICE (OUTPATIENT)
Dept: FAMILY MEDICINE | Facility: CLINIC | Age: 47
End: 2017-09-09

## 2017-09-09 DIAGNOSIS — L71.9 ROSACEA: Primary | ICD-10-CM

## 2017-09-11 RX ORDER — MINOCYCLINE HYDROCHLORIDE 50 MG/1
TABLET ORAL
Qty: 30 TABLET | Refills: 6 | Status: SHIPPED | OUTPATIENT
Start: 2017-09-11

## 2017-10-10 ENCOUNTER — OFFICE VISIT (OUTPATIENT)
Dept: FAMILY MEDICINE | Facility: CLINIC | Age: 47
End: 2017-10-10
Payer: COMMERCIAL

## 2017-10-10 VITALS
HEART RATE: 75 BPM | BODY MASS INDEX: 23.05 KG/M2 | OXYGEN SATURATION: 100 % | TEMPERATURE: 97.9 F | WEIGHT: 135 LBS | HEIGHT: 64 IN | DIASTOLIC BLOOD PRESSURE: 68 MMHG | SYSTOLIC BLOOD PRESSURE: 100 MMHG

## 2017-10-10 DIAGNOSIS — F32.0 MILD SINGLE CURRENT EPISODE OF MAJOR DEPRESSIVE DISORDER (H): Primary | ICD-10-CM

## 2017-10-10 PROCEDURE — 99213 OFFICE O/P EST LOW 20 MIN: CPT | Performed by: FAMILY MEDICINE

## 2017-10-10 RX ORDER — BUPROPION HYDROCHLORIDE 150 MG/1
150 TABLET ORAL EVERY MORNING
Qty: 90 TABLET | Refills: 1 | Status: SHIPPED | OUTPATIENT
Start: 2017-10-10 | End: 2017-11-28

## 2017-10-10 ASSESSMENT — ANXIETY QUESTIONNAIRES
GAD7 TOTAL SCORE: 1
3. WORRYING TOO MUCH ABOUT DIFFERENT THINGS: NOT AT ALL
7. FEELING AFRAID AS IF SOMETHING AWFUL MIGHT HAPPEN: NOT AT ALL
1. FEELING NERVOUS, ANXIOUS, OR ON EDGE: NOT AT ALL
5. BEING SO RESTLESS THAT IT IS HARD TO SIT STILL: NOT AT ALL
IF YOU CHECKED OFF ANY PROBLEMS ON THIS QUESTIONNAIRE, HOW DIFFICULT HAVE THESE PROBLEMS MADE IT FOR YOU TO DO YOUR WORK, TAKE CARE OF THINGS AT HOME, OR GET ALONG WITH OTHER PEOPLE: NOT DIFFICULT AT ALL
2. NOT BEING ABLE TO STOP OR CONTROL WORRYING: NOT AT ALL
6. BECOMING EASILY ANNOYED OR IRRITABLE: SEVERAL DAYS

## 2017-10-10 ASSESSMENT — PATIENT HEALTH QUESTIONNAIRE - PHQ9
SUM OF ALL RESPONSES TO PHQ QUESTIONS 1-9: 2
5. POOR APPETITE OR OVEREATING: NOT AT ALL

## 2017-10-10 NOTE — PROGRESS NOTES
SUBJECTIVE:   Mary Pearson is a 47 year old female who presents to clinic today for the following health issues:      Abnormal Mood Symptoms  Onset: x one month    Description:   Depression: YES  Anxiety: no    Accompanying Signs & Symptoms:  Still participating in activities that you used to enjoy: no  Fatigue: no  Irritability: YES  Difficulty concentrating: no  Changes in appetite: no  Problems with sleep: no  Heart racing/beating fast : no  Thoughts of hurting yourself or others: none    History:   Recent stress: YES- new job and also ended relationship  Prior depression hospitalization: None  Family history of depression: no  Family history of anxiety: no    Precipitating factors:   Alcohol/drug use: no    Alleviating factors:      Therapies Tried and outcome: Lexapro (Escitalopram) and Wellbutrin (Bupropion) used in the past. Wellbutrin works well. She would like to use it.   PHQ-9 SCORE 4/25/2017 8/25/2017 10/10/2017   Total Score 0 4 2     CIPRIANO-7 SCORE 4/25/2017 8/25/2017 10/10/2017   Total Score 0 0 1           Problem list and histories reviewed & adjusted, as indicated.  Additional history: as documented    Patient Active Problem List   Diagnosis     Atypical glandular cells on cervical Pap smear     Acne vulgaris     Acquired hypothyroidism     Major depressive disorder with single episode, in remission (H)     DUB (dysfunctional uterine bleeding) - on OCP     Past Surgical History:   Procedure Laterality Date     AS REDUCTION OF LARGE BREAST         Social History   Substance Use Topics     Smoking status: Never Smoker     Smokeless tobacco: Never Used     Alcohol use Yes      Comment: once a month     Family History   Problem Relation Age of Onset     Colon Cancer Mother 49     DIABETES Father          Current Outpatient Prescriptions   Medication Sig Dispense Refill     buPROPion (WELLBUTRIN XL) 150 MG 24 hr tablet Take 1 tablet (150 mg) by mouth every morning 90 tablet 1     minocycline HCl 50 MG  "TABS 1 tab po q day 30 tablet 6     clindamycin (CLINDAMAX) 1 % topical gel Apply to affected areas on face bid 30 g 11     metroNIDAZOLE (METROGEL) 1 % gel Apply topically daily Apply to affected areas on face bid 60 g 3     Levothyroxine Sodium 88 MCG CAPS Take 88 mcg by mouth daily 90 capsule 1     biotin 1000 MCG TABS tablet Take 1,000 mcg by mouth daily Takes 2,500mcg       Probiotic Product (PROBIOTIC ADVANCED PO)        Allergies   Allergen Reactions     Nickel Rash         Reviewed and updated as needed this visit by clinical staffTobacco  Allergies  Meds       Reviewed and updated as needed this visit by Provider  Meds         ROS:  C: NEGATIVE for fever, chills, change in weight  E/M: NEGATIVE for ear, mouth and throat problems  R: NEGATIVE for significant cough or SOB  CV: NEGATIVE for chest pain, palpitations or peripheral edema    OBJECTIVE:                                                    /68  Pulse 75  Temp 97.9  F (36.6  C)  Ht 5' 3.5\" (1.613 m)  Wt 135 lb (61.2 kg)  SpO2 100%  BMI 23.54 kg/m2  Body mass index is 23.54 kg/(m^2).   GENERAL: healthy, alert, well nourished, well hydrated, no distress  NECK: no tenderness, no adenopathy, no asymmetry, no masses, no stiffness; thyroid- normal to palpation  RESP: lungs clear to auscultation - no rales, no rhonchi, no wheezes  CV: regular rates and rhythm, normal S1 S2, no S3 or S4 and no murmur, no click or rub -  PSYCH: Alert and oriented times 3; speech- coherent , normal rate and volume; able to articulate logical thoughts, able to abstract reason, no tangential thoughts, no hallucinations or delusions, affect- normal         ASSESSMENT/PLAN:                                                        ICD-10-CM    1. Mild single current episode of major depressive disorder (H) F32.0 buPROPion (WELLBUTRIN XL) 150 MG 24 hr tablet     Patient is symptomatic. Even though the PHQ -9 scores are low, I would be okay with her trying Wellbutrin " again. Side effect profile reviewed.   Recommended to follow up in 1 month for recheck.      Patricio Miller MD  [facility]

## 2017-10-10 NOTE — MR AVS SNAPSHOT
After Visit Summary   10/10/2017    Mary Pearson    MRN: [id number]           Patient Information     Date Of Birth          1970        Visit Information        Provider Department      10/10/2017 5:40 PM [facility] [place]        Today's Diagnoses     Mild single current episode of major depressive disorder (H)    -  1       Follow-ups after your visit        Follow-up notes from your care team     Return in about 7 weeks (around 12/1/2017) for Annual Physical Exam, Fasting labs, Mood Recheck.      Who to contact     If you have questions or need follow up information about today's clinic visit or your schedule please contact [facility] directly at [phone].  Normal or non-critical lab and imaging results will be communicated to you by GetBulbhart, letter or phone within 4 business days after the clinic has received the results. If you do not hear from us within 7 days, please contact the clinic through GetBulbhart or phone. If you have a critical or abnormal lab result, we will notify you by phone as soon as possible.  Submit refill requests through WorkHound or call your pharmacy and they will forward the refill request to us. Please allow 3 business days for your refill to be completed.          Additional Information About Your Visit        MyChart Information     WorkH[employer] gives you secure access to your electronic health record. If you see a primary care provider, you can also send messages to your care team and make appointments. If you have questions, please call your primary care clinic.  If you do not have a primary care provider, please call [phone] and they will assist you.        Care EveryWhere ID     This is your Care EveryWhere ID. This could be used by other organizations to access your [place] medical records  [id number]        Your Vitals Were     Pulse Temperature Height Pulse Oximetry BMI (Body Mass Index)       75 97.9  F  "(36.6  C) 5' 3.5\" (1.613 m) 100% 23.54 kg/m2        Blood Pressure from Last 3 Encounters:   10/10/17 100/68   08/25/17 102/70   06/30/17 110/70    Weight from Last 3 Encounters:   10/10/17 135 lb (61.2 kg)   08/25/17 136 lb 9.6 oz (62 kg)   06/30/17 134 lb 3.2 oz (60.9 kg)              Today, you had the following     No orders found for display         Today's Medication Changes          These changes are accurate as of: 10/10/17  5:54 PM.  If you have any questions, ask your nurse or doctor.               Start taking these medicines.        Dose/Directions    buPROPion 150 MG 24 hr tablet   Commonly known as:  WELLBUTRIN XL   Used for:  Mild single current episode of major depressive disorder (H)   Started by:  Patricio Miller MD        Dose:  150 mg   Take 1 tablet (150 mg) by mouth every morning   Quantity:  90 tablet   Refills:  1            Where to get your medicines      These medications were sent to [place] [address] [place] [address] [address]     Phone:  [phone]     buPROPion 150 MG 24 hr tablet                Primary Care Provider Office Phone # Fax #    Patricio Miller [phone]       [address]  [place] [address]        Equal Access to Services     [facility] AH: Hadii jamir renner hadasho Sovirgie, waaxda luqadaha, qaybta kaalmada adeegyada, ramona parker. So [facility] [phone].    ATENCIÓN: Si habla español, tiene a gilman disposición servicios gratuitos de asistencia lingüística. Llame al [phone].    We comply with applicable federal civil rights laws and [place] laws. We do not discriminate on the basis of race, color, national origin, age, disability, sex, sexual orientation, or gender identity.            Thank you!     Thank you for choosing [facility] [place]  for your care. Our goal is always to provide you with excellent care. Hearing back from our patients is one " way we can continue to improve our services. Please take a few minutes to complete the written survey that you may receive in the mail after your visit with us. Thank you!             Your Updated Medication List - Protect others around you: Learn how to safely use, store and throw away your medicines at www.disposemymeds.org.          This list is accurate as of: 10/10/17  5:54 PM.  Always use your most recent med list.                   Brand Name Dispense Instructions for use Diagnosis    biotin 1000 MCG Tabs tablet      Take 1,000 mcg by mouth daily Takes 2,500mcg        buPROPion 150 MG 24 hr tablet    WELLBUTRIN XL    90 tablet    Take 1 tablet (150 mg) by mouth every morning    Mild single current episode of major depressive disorder (H)       clindamycin 1 % topical gel    CLINDAMAX    30 g    Apply to affected areas on face bid    Acne vulgaris       Levothyroxine Sodium 88 MCG Caps     90 capsule    Take 88 mcg by mouth daily    Hypothyroidism, unspecified type       metroNIDAZOLE 1 % gel    METROGEL    60 g    Apply topically daily Apply to affected areas on face bid    Rosacea       minocycline HCl 50 MG Tabs     30 tablet    1 tab po q day    Rosacea       PROBIOTIC ADVANCED PO

## 2017-10-10 NOTE — NURSING NOTE
"Chief Complaint   Patient presents with     Depression       Initial /68  Pulse 75  Temp 97.9  F (36.6  C)  Ht 5' 3.5\" (1.613 m)  Wt 135 lb (61.2 kg)  SpO2 100%  BMI 23.54 kg/m2 Estimated body mass index is 23.54 kg/(m^2) as calculated from the following:    Height as of this encounter: 5' 3.5\" (1.613 m).    Weight as of this encounter: 135 lb (61.2 kg).  Medication Reconciliation: complete   Arlen Cole CMA      "

## 2017-10-11 ASSESSMENT — ANXIETY QUESTIONNAIRES: GAD7 TOTAL SCORE: 1

## 2017-11-28 ENCOUNTER — OFFICE VISIT (OUTPATIENT)
Dept: FAMILY MEDICINE | Facility: CLINIC | Age: 47
End: 2017-11-28
Payer: COMMERCIAL

## 2017-11-28 VITALS
OXYGEN SATURATION: 99 % | HEART RATE: 72 BPM | TEMPERATURE: 97.7 F | SYSTOLIC BLOOD PRESSURE: 98 MMHG | HEIGHT: 64 IN | WEIGHT: 136 LBS | DIASTOLIC BLOOD PRESSURE: 60 MMHG | BODY MASS INDEX: 23.22 KG/M2 | RESPIRATION RATE: 16 BRPM

## 2017-11-28 DIAGNOSIS — F32.0 MILD SINGLE CURRENT EPISODE OF MAJOR DEPRESSIVE DISORDER (H): ICD-10-CM

## 2017-11-28 DIAGNOSIS — E03.9 HYPOTHYROIDISM, UNSPECIFIED TYPE: ICD-10-CM

## 2017-11-28 PROCEDURE — 99214 OFFICE O/P EST MOD 30 MIN: CPT | Performed by: FAMILY MEDICINE

## 2017-11-28 RX ORDER — LANOLIN ALCOHOL/MO/W.PET/CERES
3-6 CREAM (GRAM) TOPICAL
COMMUNITY
Start: 2017-11-28

## 2017-11-28 RX ORDER — BUPROPION HYDROCHLORIDE 300 MG/1
300 TABLET ORAL EVERY MORNING
Qty: 90 TABLET | Refills: 1 | Status: SHIPPED | OUTPATIENT
Start: 2017-11-28

## 2017-11-28 RX ORDER — LEVOTHYROXINE SODIUM 88 UG/1
88 CAPSULE ORAL DAILY
Qty: 90 CAPSULE | Refills: 1 | Status: SHIPPED | OUTPATIENT
Start: 2017-11-28 | End: 2018-02-21

## 2017-11-28 ASSESSMENT — ANXIETY QUESTIONNAIRES
2. NOT BEING ABLE TO STOP OR CONTROL WORRYING: NOT AT ALL
1. FEELING NERVOUS, ANXIOUS, OR ON EDGE: NOT AT ALL
5. BEING SO RESTLESS THAT IT IS HARD TO SIT STILL: NOT AT ALL
7. FEELING AFRAID AS IF SOMETHING AWFUL MIGHT HAPPEN: NOT AT ALL
6. BECOMING EASILY ANNOYED OR IRRITABLE: NOT AT ALL
3. WORRYING TOO MUCH ABOUT DIFFERENT THINGS: SEVERAL DAYS
GAD7 TOTAL SCORE: 1
IF YOU CHECKED OFF ANY PROBLEMS ON THIS QUESTIONNAIRE, HOW DIFFICULT HAVE THESE PROBLEMS MADE IT FOR YOU TO DO YOUR WORK, TAKE CARE OF THINGS AT HOME, OR GET ALONG WITH OTHER PEOPLE: NOT DIFFICULT AT ALL

## 2017-11-28 ASSESSMENT — PATIENT HEALTH QUESTIONNAIRE - PHQ9
5. POOR APPETITE OR OVEREATING: NOT AT ALL
SUM OF ALL RESPONSES TO PHQ QUESTIONS 1-9: 6

## 2017-11-28 NOTE — MR AVS SNAPSHOT
After Visit Summary   11/28/2017    Mary Pearson    MRN: [id number]           Patient Information     Date Of Birth          1970        Visit Information        Provider Department      11/28/2017 6:40 PM [facility]        Today's Diagnoses     Mild single current episode of major depressive disorder (H)        Hypothyroidism, unspecified type           Follow-ups after your visit        Follow-up notes from your care team     Return in about 1 month (around 12/28/2017) for Mood Recheck.      Who to contact     If you have questions or need follow up information about today's clinic visit or your schedule please contact [facility] directly at [phone].  Normal or non-critical lab and imaging results will be communicated to you by Janus Biotherapeuticshart, letter or phone within 4 business days after the clinic has received the results. If you do not hear from us within 7 days, please contact the clinic through Janus Biotherapeuticshart or phone. If you have a critical or abnormal lab result, we will notify you by phone as soon as possible.  Submit refill requests through [employer] or call your pharmacy and they will forward the refill request to us. Please allow 3 business days for your refill to be completed.          Additional Information About Your Visit        MyChart Information     [employer] gives you secure access to your electronic health record. If you see a primary care provider, you can also send messages to your care team and make appointments. If you have questions, please call your primary care clinic.  If you do not have a primary care provider, please call [phone] and they will assist you.        Care EveryWhere ID     This is your Care EveryWhere ID. This could be used by other organizations to access your [place] medical records  [id number]        Your Vitals Were     Pulse Temperature Respirations Height Pulse Oximetry BMI (Body Mass Index)    72  "97.7  F (36.5  C) 16 5' 3.5\" (1.613 m) 99% 23.71 kg/m2       Blood Pressure from Last 3 Encounters:   11/28/17 98/60   10/10/17 100/68   08/25/17 102/70    Weight from Last 3 Encounters:   11/28/17 136 lb (61.7 kg)   10/10/17 135 lb (61.2 kg)   08/25/17 136 lb 9.6 oz (62 kg)              Today, you had the following     No orders found for display         Today's Medication Changes          These changes are accurate as of: 11/28/17  7:03 PM.  If you have any questions, ask your nurse or doctor.               Start taking these medicines.        Dose/Directions    melatonin 3 MG tablet   Used for:  Mild single current episode of major depressive disorder (H)   Started by:  Patricio Miller MD        Dose:  3-6 mg   Take 1-2 tablets (3-6 mg) by mouth nightly as needed for sleep   Refills:  0         These medicines have changed or have updated prescriptions.        Dose/Directions    buPROPion 300 MG 24 hr tablet   Commonly known as:  WELLBUTRIN XL   This may have changed:    - medication strength  - how much to take   Used for:  Mild single current episode of major depressive disorder (H)   Changed by:  Patricio Miller MD        Dose:  300 mg   Take 1 tablet (300 mg) by mouth every morning   Quantity:  90 tablet   Refills:  1         Stop taking these medicines if you haven't already. Please contact your care team if you have questions.     BENADRYL PO   Stopped by:  Patricio Miller MD                Where to get your medicines      These medications were sent to [facility] [address] [place] [address] [address]     Phone:  [phone]     buPROPion 300 MG 24 hr tablet    Levothyroxine Sodium 88 MCG Caps         Some of these will need a paper prescription and others can be bought over the counter.  Ask your nurse if you have questions.     You don't need a prescription for these medications     melatonin 3 MG tablet                Primary Care Provider Office Phone # " Fax #    Patricio Miller [phone] [address]  [place] [address]        Equal Access to Services     [facility] : Hadii aad ku hadorvillejeremy Taylorvirgie, walopezda luqadaha, qaybta kamakenzieda paras, ramona víctorin hayaathea bishopshannon lopezmike parker. So [facility] [phone].    ATENCIÓN: Si habla español, tiene a gilman disposición servicios gratuitos de asistencia lingüística. Llame al [phone].    We comply with applicable federal civil rights laws and [place] laws. We do not discriminate on the basis of race, color, national origin, age, disability, sex, sexual orientation, or gender identity.            Thank you!     Thank you for choosing [facility] [place]  for your care. Our goal is always to provide you with excellent care. Hearing back from our patients is one way we can continue to improve our services. Please take a few minutes to complete the written survey that you may receive in the mail after your visit with us. Thank you!             Your Updated Medication List - Protect others around you: Learn how to safely use, store and throw away your medicines at www.disposemymeds.org.          This list is accurate as of: 11/28/17  7:03 PM.  Always use your most recent med list.                   Brand Name Dispense Instructions for use Diagnosis    buPROPion 300 MG 24 hr tablet    WELLBUTRIN XL    90 tablet    Take 1 tablet (300 mg) by mouth every morning    Mild single current episode of major depressive disorder (H)       clindamycin 1 % topical gel    CLINDAMAX    30 g    Apply to affected areas on face bid    Acne vulgaris       Levothyroxine Sodium 88 MCG Caps     90 capsule    Take 88 mcg by mouth daily    Hypothyroidism, unspecified type       melatonin 3 MG tablet      Take 1-2 tablets (3-6 mg) by mouth nightly as needed for sleep    Mild single current episode of major depressive disorder (H)       metroNIDAZOLE 1 % gel    METROGEL    60 g    Apply topically daily  Apply to affected areas on face bid    Rosacea       minocycline HCl 50 MG Tabs     30 tablet    1 tab po q day    Rosacea

## 2017-11-29 ASSESSMENT — ANXIETY QUESTIONNAIRES: GAD7 TOTAL SCORE: 1

## 2017-11-29 NOTE — PROGRESS NOTES
"Chief Complaint   Patient presents with     Recheck Medication       Initial BP 98/60  Pulse 72  Temp 97.7  F (36.5  C)  Resp 16  Ht 5' 3.5\" (1.613 m)  Wt 136 lb (61.7 kg)  SpO2 99%  BMI 23.71 kg/m2 Estimated body mass index is 23.71 kg/(m^2) as calculated from the following:    Height as of this encounter: 5' 3.5\" (1.613 m).    Weight as of this encounter: 136 lb (61.7 kg).  Medication Reconciliation: complete. CORY Eckert LPN        SUBJECTIVE:   Mary Pearson is a 47 year old female who presents to clinic today for the following health issues:      Medication Followup of Wellbutrin    Taking Medication as prescribed: yes    Side Effects:  None    Medication Helping Symptoms:  Some improvement - feels she needs higher dose     C/o insomnia. Uses benadryl for that.     Depression Followup    Status since last visit: Worsened     See PHQ-9 for current symptoms.  Other associated symptoms: None    Complicating factors:   Significant life event:  No   Current substance abuse:  None  Anxiety or Panic symptoms:  No    PHQ-9 Score and MyChart F/U Questions 8/25/2017 10/10/2017 11/28/2017   Total Score 4 2 6   Q9: Suicide Ideation Not at all Not at all Several days       PHQ-9  English  PHQ-9   Any Language  Suicide Assessment Five-step Evaluation and Treatment (SAFE-T)  Hypothyroidism Follow-up      Since last visit, patient describes the following symptoms: Weight stable, no hair loss, no skin changes, no constipation, no loose stools        Problem list and histories reviewed & adjusted, as indicated.  Additional history: as documented    Patient Active Problem List   Diagnosis     Atypical glandular cells on cervical Pap smear     Acne vulgaris     Acquired hypothyroidism     Major depressive disorder with single episode, in remission (H)     DUB (dysfunctional uterine bleeding) - on OCP     Past Surgical History:   Procedure Laterality Date     AS REDUCTION OF LARGE BREAST         Social History   Substance " "Use Topics     Smoking status: Never Smoker     Smokeless tobacco: Never Used     Alcohol use Yes      Comment: once a month     Family History   Problem Relation Age of Onset     Colon Cancer Mother 49     DIABETES Father          Current Outpatient Prescriptions   Medication Sig Dispense Refill     buPROPion (WELLBUTRIN XL) 300 MG 24 hr tablet Take 1 tablet (300 mg) by mouth every morning 90 tablet 1     melatonin 3 MG tablet Take 1-2 tablets (3-6 mg) by mouth nightly as needed for sleep       Levothyroxine Sodium 88 MCG CAPS Take 88 mcg by mouth daily 90 capsule 1     minocycline HCl 50 MG TABS 1 tab po q day 30 tablet 6     clindamycin (CLINDAMAX) 1 % topical gel Apply to affected areas on face bid 30 g 11     metroNIDAZOLE (METROGEL) 1 % gel Apply topically daily Apply to affected areas on face bid 60 g 3     Allergies   Allergen Reactions     Nickel Rash         Reviewed and updated as needed this visit by clinical staffTobacco  Allergies  Meds  Fam Hx  Soc Hx      Reviewed and updated as needed this visit by Provider         ROS:  C: NEGATIVE for fever, chills, change in weight  E/M: NEGATIVE for ear, mouth and throat problems  R: NEGATIVE for significant cough or SOB  CV: NEGATIVE for chest pain, palpitations or peripheral edema    OBJECTIVE:                                                    BP 98/60  Pulse 72  Temp 97.7  F (36.5  C)  Resp 16  Ht 5' 3.5\" (1.613 m)  Wt 136 lb (61.7 kg)  SpO2 99%  BMI 23.71 kg/m2  Body mass index is 23.71 kg/(m^2).   GENERAL: healthy, alert, well nourished, well hydrated, no distress  HENT: ear canals- normal; TMs- normal; Nose- normal; Mouth- no ulcers, no lesions  NECK: no tenderness, no adenopathy, no asymmetry, no masses, no stiffness; thyroid- normal to palpation  RESP: lungs clear to auscultation - no rales, no rhonchi, no wheezes  CV: regular rates and rhythm, normal S1 S2, no S3 or S4 and no murmur, no click or rub -  PSYCH: Alert and oriented times 3; " speech- coherent , normal rate and volume; able to articulate logical thoughts, able to abstract reason, no tangential thoughts, no hallucinations or delusions, affect- normal         ASSESSMENT/PLAN:                                                      1. Mild single current episode of major depressive disorder (H)  Increase the dose of Wellbutrin from 150 to 300 mg XL QD.  Recommended tial of melatonin for sleep d/o  - buPROPion (WELLBUTRIN XL) 300 MG 24 hr tablet; Take 1 tablet (300 mg) by mouth every morning  Dispense: 90 tablet; Refill: 1  - melatonin 3 MG tablet; Take 1-2 tablets (3-6 mg) by mouth nightly as needed for sleep    2. Hypothyroidism, unspecified type  TSH   Date Value Ref Range Status   08/25/2017 1.00 0.40 - 4.00 mU/L Final     Well controlled.     - Levothyroxine Sodium 88 MCG CAPS; Take 88 mcg by mouth daily  Dispense: 90 capsule; Refill: 1      Follow up with Provider - 1 month     Patricio Miller MD  [facility] [place]

## 2017-12-30 ENCOUNTER — MYC MEDICAL ADVICE (OUTPATIENT)
Dept: FAMILY MEDICINE | Facility: CLINIC | Age: 47
End: 2017-12-30

## 2018-01-06 ENCOUNTER — MYC MEDICAL ADVICE (OUTPATIENT)
Dept: FAMILY MEDICINE | Facility: CLINIC | Age: 48
End: 2018-01-06

## 2018-01-06 DIAGNOSIS — L71.9 ROSACEA: Primary | ICD-10-CM

## 2018-01-08 RX ORDER — MINOCYCLINE HYDROCHLORIDE 50 MG/1
50 CAPSULE ORAL DAILY
Qty: 90 CAPSULE | Refills: 3 | Status: SHIPPED | OUTPATIENT
Start: 2018-01-08

## 2018-01-08 NOTE — TELEPHONE ENCOUNTER
PHQ-9 SCORE 10/10/2017 11/28/2017 1/6/2018   Total Score MyChart - - 2 (Minimal depression)   Total Score 2 6 2       Please see mychart message and advise.      Thank you,  Francine Ochoa RN  [facility]  [phone]

## 2018-01-08 NOTE — TELEPHONE ENCOUNTER
Symptoms seem well controlled!    Patricio Miller MD  [facility], [place]

## 2018-02-21 ENCOUNTER — OFFICE VISIT (OUTPATIENT)
Dept: FAMILY MEDICINE | Facility: CLINIC | Age: 48
End: 2018-02-21
Payer: COMMERCIAL

## 2018-02-21 VITALS
DIASTOLIC BLOOD PRESSURE: 67 MMHG | BODY MASS INDEX: 23.19 KG/M2 | WEIGHT: 133 LBS | TEMPERATURE: 97.7 F | SYSTOLIC BLOOD PRESSURE: 96 MMHG | HEART RATE: 120 BPM

## 2018-02-21 DIAGNOSIS — R19.6 HALITOSIS: ICD-10-CM

## 2018-02-21 DIAGNOSIS — L65.9 HAIR THINNING: ICD-10-CM

## 2018-02-21 DIAGNOSIS — F32.5 MAJOR DEPRESSIVE DISORDER WITH SINGLE EPISODE, IN REMISSION (H): Primary | ICD-10-CM

## 2018-02-21 DIAGNOSIS — E03.9 HYPOTHYROIDISM, UNSPECIFIED TYPE: ICD-10-CM

## 2018-02-21 PROCEDURE — 99214 OFFICE O/P EST MOD 30 MIN: CPT | Performed by: INTERNAL MEDICINE

## 2018-02-21 RX ORDER — LEVOTHYROXINE SODIUM 88 UG/1
88 TABLET ORAL DAILY
Qty: 90 TABLET | Refills: 3 | Status: SHIPPED | OUTPATIENT
Start: 2018-02-21

## 2018-02-21 NOTE — MR AVS SNAPSHOT
After Visit Summary   2/21/2018    Mary Pearson    MRN: [id number]           Patient Information     Date Of Birth          1970        Visit Information        Provider Department      2/21/2018 6:20 PM Eugenia Mejia MD [facility] [place]        Today's Diagnoses     Halitosis    -  1    Hypothyroidism, unspecified type        Hair thinning           Follow-ups after your visit        Follow-up notes from your care team     Return in about 6 months (around 8/21/2018) for Physical Exam.      Future tests that were ordered for you today     Open Future Orders        Priority Expected Expires Ordered    H Pylori antigen, stool Routine  3/23/2018 2/21/2018            Who to contact     If you have questions or need follow up information about today's clinic visit or your schedule please contact [facility] [place] directly at [phone].  Normal or non-critical lab and imaging results will be communicated to you by MyChart, letter or phone within 4 business days after the clinic has received the results. If you do not hear from us within 7 days, please contact the clinic through [employer] or phone. If you have a critical or abnormal lab result, we will notify you by phone as soon as possible.  Submit refill requests through [employer] or call your pharmacy and they will forward the refill request to us. Please allow 3 business days for your refill to be completed.          Additional Information About Your Visit        MyChart Information     [employer] gives you secure access to your electronic health record. If you see a primary care provider, you can also send messages to your care team and make appointments. If you have questions, please call your primary care clinic.  If you do not have a primary care provider, please call [phone] and they will assist you.        Care EveryWhere ID     This is your Care EveryWhere ID. This could be used by other organizations to  access your [place] medical records  [id number]        Your Vitals Were     Pulse Temperature Last Period BMI (Body Mass Index)          120 97.7  F (36.5  C) (Tympanic) 02/06/2018 23.19 kg/m2         Blood Pressure from Last 3 Encounters:   02/21/18 96/67   11/28/17 98/60   10/10/17 100/68    Weight from Last 3 Encounters:   02/21/18 133 lb (60.3 kg)   11/28/17 136 lb (61.7 kg)   10/10/17 135 lb (61.2 kg)                 Today's Medication Changes          These changes are accurate as of 2/21/18  6:41 PM.  If you have any questions, ask your nurse or doctor.               Start taking these medicines.        Dose/Directions    levothyroxine 88 MCG tablet   Commonly known as:  SYNTHROID   Used for:  Hypothyroidism, unspecified type   Replaces:  Levothyroxine Sodium 88 MCG Caps   Started by:  Eugenia Mejia MD        Dose:  88 mcg   Take 1 tablet (88 mcg) by mouth daily   Quantity:  90 tablet   Refills:  3       Minoxidil 5 % Foam   Commonly known as:  ROGAINE WOMENS   Used for:  Hair thinning   Started by:  Eugenia Mejia MD        1/2 capful once daily.   Quantity:  60 g   Refills:  3         Stop taking these medicines if you haven't already. Please contact your care team if you have questions.     Levothyroxine Sodium 88 MCG Caps   Replaced by:  levothyroxine 88 MCG tablet   Stopped by:  Eugenia Mejia MD                Where to get your medicines      [place] [facility] [place] [address]  [address], [place] [address]     Phone:  [phone]     levothyroxine 88 MCG tablet    Minoxidil 5 % Foam                Primary Care Provider Office Phone # Fax #    Patricio Miller [phone]       [address]  [place] [address]        Equal Access to Services     SAGE [place] AH: Hadii jamir Love, waaxda luqstacey, qaybta kaalmada ramona crain  brooks tuttle ah. So [facility] [phone].    ATENCIÓN: Si sabra traylor, tiene a gilman disposición servicios gratuitos de asistencia lingüística. Tor al [phone].    We comply with applicable federal civil rights laws and [place] laws. We do not discriminate on the basis of race, color, national origin, age, disability, sex, sexual orientation, or gender identity.            Thank you!     Thank you for choosing [facility] [place]  for your care. Our goal is always to provide you with excellent care. Hearing back from our patients is one way we can continue to improve our services. Please take a few minutes to complete the written survey that you may receive in the mail after your visit with us. Thank you!             Your Updated Medication List - Protect others around you: Learn how to safely use, store and throw away your medicines at www.disposemymeds.org.          This list is accurate as of 2/21/18  6:41 PM.  Always use your most recent med list.                   Brand Name Dispense Instructions for use Diagnosis    buPROPion 300 MG 24 hr tablet    WELLBUTRIN XL    90 tablet    Take 1 tablet (300 mg) by mouth every morning    Mild single current episode of major depressive disorder (H)       clindamycin 1 % topical gel    CLINDAMAX    30 g    Apply to affected areas on face bid    Acne vulgaris       levothyroxine 88 MCG tablet    SYNTHROID    90 tablet    Take 1 tablet (88 mcg) by mouth daily    Hypothyroidism, unspecified type       melatonin 3 MG tablet      Take 1-2 tablets (3-6 mg) by mouth nightly as needed for sleep    Mild single current episode of major depressive disorder (H)       metroNIDAZOLE 1 % gel    METROGEL    60 g    Apply topically daily Apply to affected areas on face bid    Rosacea       * minocycline HCl 50 MG Tabs     30 tablet    1 tab po q day    Rosacea       * minocycline 50 MG capsule    MINOCIN/DYNACIN    90 capsule    Take 1 capsule (50 mg) by mouth daily     Rosacea       Minoxidil 5 % Foam    ROGAINE WOMENS    60 g    1/2 capful once daily.    Hair thinning       * Notice:  This list has 2 medication(s) that are the same as other medications prescribed for you. Read the directions carefully, and ask your doctor or other care provider to review them with you.

## 2018-02-22 NOTE — NURSING NOTE
"Chief Complaint   Patient presents with     Recheck Medication     Thyroid Problem       Initial BP 96/67 (BP Location: Left arm, Patient Position: Chair, Cuff Size: Adult Regular)  Pulse 120  Temp 97.7  F (36.5  C) (Tympanic)  Wt 133 lb (60.3 kg)  LMP 02/06/2018  BMI 23.19 kg/m2 Estimated body mass index is 23.19 kg/(m^2) as calculated from the following:    Height as of 11/28/17: 5' 3.5\" (1.613 m).    Weight as of this encounter: 133 lb (60.3 kg).  Medication Reconciliation: complete  "

## 2018-02-22 NOTE — PROGRESS NOTES
SUBJECTIVE:   Mary Pearson is a 47 year old female who presents to clinic today for the following health issues:    Thought she was due for check of TSH, needs refill of levothyroxine.     Irritability -she is not taking Wellbutrin 150 mg twice daily.  This has helped quite a bit with her depression symptoms.  However recently she has noticed she is more irritable and angry.  For example was at a lab visit recently against a customer was being rude to 1 of the lab techs, she stepped in and started talking back to the customer and defense of the .    Halitosis -has been told on and off for years that she has bad breath.  She brushes her teeth 3 times a day, uses a mouth rinse and mouthwash, flosses daily, uses a tongue brush.  At her last dental checkup, she was told everything looked normal.  Has found this quite frustrating.  She denies any difficulty swallowing or feel like things are stuck in her throat.  She has no GI symptoms.  She read that H pylori can cause bad breath.    Hair thinning - interested in trying Rogaine.          Reviewed and updated as needed this visit by clinical staff  Tobacco  Allergies  Meds       Reviewed and updated as needed this visit by Provider         ROS:  Const, HENT, GI, endo, skin, psych reviewed,  otherwise negative unless noted above.       OBJECTIVE:     BP 96/67 (BP Location: Left arm, Patient Position: Chair, Cuff Size: Adult Regular)  Pulse 120  Temp 97.7  F (36.5  C) (Tympanic)  Wt 133 lb (60.3 kg)  LMP 02/06/2018  BMI 23.19 kg/m2  Body mass index is 23.19 kg/(m^2).    Gen: pleasant, well appearing woman, no distress  HENT: oropharynx clear, normal tongue, tonsils appear normal, MMM        ASSESSMENT/PLAN:       1. Major depressive disorder with single episode, in remission (H)  Recommended counseling versus adding medication.  She would like to start with counseling, which I agree would be a good option.  She will start with the employee assistance  program at her work, will contact us if she needs referral to therapy in the future.    2. Hypothyroidism, unspecified type  TSH was within normal limits 6 months ago.  She is not having any symptoms of hyper or hypothyroidism.  Refill ordered.  - levothyroxine (SYNTHROID) 88 MCG tablet; Take 1 tablet (88 mcg) by mouth daily  Dispense: 90 tablet; Refill: 3    3. Halitosis  Oral exam is unremarkable.  She does not have symptoms concerning for Zenker's diverticulum.  She has seen her dentist recently.  H. pylori infection is unlikely to cause halitosis and she has no symptoms, but this is an easy test to do for reassurance.  Could consider ENT evaluation if symptoms persist in the H pylori is negative.  - H Pylori antigen, stool; Future    4. Hair thinning  Can certainly try minoxidil.  If not helping, consider seeing dermatology.  - Minoxidil (ROGAINE WOMENS) 5 % FOAM; 1/2 capful once daily.  Dispense: 60 g; Refill: 3    Follow up 6 months for annual visit with Dr. Miller.     Eugenia Mejia MD  [facility]

## 2018-03-11 ENCOUNTER — HEALTH MAINTENANCE LETTER (OUTPATIENT)
Age: 48
End: 2018-03-11

## 2018-08-07 ENCOUNTER — HEALTH MAINTENANCE LETTER (OUTPATIENT)
Age: 48
End: 2018-08-07

## 2018-12-17 DIAGNOSIS — E03.9 HYPOTHYROIDISM, UNSPECIFIED TYPE: ICD-10-CM

## 2018-12-17 RX ORDER — LEVOTHYROXINE SODIUM 88 UG/1
TABLET ORAL
Qty: 90 TABLET | Refills: 3 | OUTPATIENT
Start: 2018-12-17

## 2018-12-17 NOTE — TELEPHONE ENCOUNTER
"Requested Prescriptions   Pending Prescriptions Disp Refills     levothyroxine (SYNTHROID/LEVOTHROID) 88 MCG tablet [Pharmacy Med Name: LEVOTHYROXINE SODIUM 88MCG TABS]  Last Written Prescription Date:  2/21/18  Last Fill Quantity: 90,  # refills: 3   Last office visit: 2/21/2018 with prescribing provider:  Jackie   Future Office Visit:     90 tablet 3     Sig: TAKE ONE TABLET BY MOUTH EVERY DAY    Thyroid Protocol Failed - 12/17/2018 12:02 PM       Failed - Normal TSH on file in past 12 months    Recent Labs   Lab Test 08/25/17  0959   TSH 1.00             Passed - Patient is 12 years or older       Passed - Recent (12 mo) or future (30 days) visit within the authorizing provider's specialty    Patient had office visit in the last 12 months or has a visit in the next 30 days with authorizing provider or within the authorizing provider's specialty.  See \"Patient Info\" tab in inbasket, or \"Choose Columns\" in Meds & Orders section of the refill encounter.             Passed - No active pregnancy on record    If patient is pregnant or has had a positive pregnancy test, please check TSH.         Passed - No positive pregnancy test in past 12 months    If patient is pregnant or has had a positive pregnancy test, please check TSH.            "

## 2018-12-17 NOTE — TELEPHONE ENCOUNTER
Duplicate- sent - info sent to pharmacy.  Francine Ochoa,RN  [facility]  [phone]

## 2018-12-21 DIAGNOSIS — E03.9 HYPOTHYROIDISM, UNSPECIFIED TYPE: ICD-10-CM

## 2018-12-24 RX ORDER — LEVOTHYROXINE SODIUM 88 UG/1
TABLET ORAL
Qty: 90 TABLET | Refills: 3 | OUTPATIENT
Start: 2018-12-24

## 2018-12-24 NOTE — TELEPHONE ENCOUNTER
"Requested Prescriptions   Pending Prescriptions Disp Refills     levothyroxine (SYNTHROID/LEVOTHROID) 88 MCG tablet [Pharmacy Med Name: LEVOTHYROXINE SODIUM 88MCG TABS] 90 tablet 3     Sig: TAKE ONE TABLET BY MOUTH EVERY DAY    Thyroid Protocol Failed - 12/21/2018  5:06 PM       Failed - Normal TSH on file in past 12 months    Recent Labs   Lab Test 08/25/17  0959   TSH 1.00             Passed - Patient is 12 years or older       Passed - Recent (12 mo) or future (30 days) visit within the authorizing provider's specialty    Patient had office visit in the last 12 months or has a visit in the next 30 days with authorizing provider or within the authorizing provider's specialty.  See \"Patient Info\" tab in inbasket, or \"Choose Columns\" in Meds & Orders section of the refill encounter.    Last Written Prescription Date:  2/21/2018  Last Fill Quantity: ,90  # refills:  3  Last office visit: 2/21/2018 with prescribing provider:     Future Office Visit:             Passed - No active pregnancy on record    If patient is pregnant or has had a positive pregnancy test, please check TSH.         Passed - No positive pregnancy test in past 12 months    If patient is pregnant or has had a positive pregnancy test, please check TSH.            "

## 2018-12-24 NOTE — TELEPHONE ENCOUNTER
patient should have refill until 2/2018- then needs appointment for recheck- info sent to pharmacy.    Francine Ochoa,RN BSN  [facility]  [phone]

## 2020-03-10 ENCOUNTER — HEALTH MAINTENANCE LETTER (OUTPATIENT)
Age: 50
End: 2020-03-10

## 2020-12-27 ENCOUNTER — HEALTH MAINTENANCE LETTER (OUTPATIENT)
Age: 50
End: 2020-12-27

## 2021-04-24 ENCOUNTER — HEALTH MAINTENANCE LETTER (OUTPATIENT)
Age: 51
End: 2021-04-24

## 2021-10-09 ENCOUNTER — HEALTH MAINTENANCE LETTER (OUTPATIENT)
Age: 51
End: 2021-10-09

## 2022-05-21 ENCOUNTER — HEALTH MAINTENANCE LETTER (OUTPATIENT)
Age: 52
End: 2022-05-21

## 2022-09-11 ENCOUNTER — HEALTH MAINTENANCE LETTER (OUTPATIENT)
Age: 52
End: 2022-09-11

## 2023-06-03 ENCOUNTER — HEALTH MAINTENANCE LETTER (OUTPATIENT)
Age: 53
End: 2023-06-03